# Patient Record
Sex: FEMALE | Race: WHITE | NOT HISPANIC OR LATINO | Employment: UNEMPLOYED | URBAN - NONMETROPOLITAN AREA
[De-identification: names, ages, dates, MRNs, and addresses within clinical notes are randomized per-mention and may not be internally consistent; named-entity substitution may affect disease eponyms.]

---
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
## 2023-01-12 ENCOUNTER — APPOINTMENT (OUTPATIENT)
Dept: ULTRASOUND IMAGING | Facility: HOSPITAL | Age: 18
End: 2023-01-12
Payer: COMMERCIAL

## 2023-01-12 ENCOUNTER — HOSPITAL ENCOUNTER (EMERGENCY)
Facility: HOSPITAL | Age: 18
Discharge: HOME | End: 2023-01-12
Attending: EMERGENCY MEDICINE | Admitting: EMERGENCY MEDICINE
Payer: COMMERCIAL

## 2023-01-12 VITALS
HEIGHT: 66 IN | OXYGEN SATURATION: 97 % | HEART RATE: 54 BPM | DIASTOLIC BLOOD PRESSURE: 73 MMHG | BODY MASS INDEX: 30.53 KG/M2 | TEMPERATURE: 97.7 F | SYSTOLIC BLOOD PRESSURE: 120 MMHG | RESPIRATION RATE: 18 BRPM | WEIGHT: 190 LBS

## 2023-01-12 DIAGNOSIS — N93.9 VAGINAL BLEEDING: Primary | ICD-10-CM

## 2023-01-12 LAB
ABO GROUP BLD: NORMAL
ALBUMIN SERPL-MCNC: 4.2 G/DL (ref 3.5–5.2)
ALBUMIN/GLOB SERPL: 1.5 G/DL
ALP SERPL-CCNC: 73 U/L (ref 39–117)
ALT SERPL W P-5'-P-CCNC: 7 U/L (ref 1–33)
ANION GAP SERPL CALCULATED.3IONS-SCNC: 8 MMOL/L (ref 5–15)
AST SERPL-CCNC: 10 U/L (ref 1–32)
BACTERIA UR QL AUTO: ABNORMAL /HPF
BASOPHILS # BLD AUTO: 0.1 10*3/MM3 (ref 0–0.2)
BASOPHILS NFR BLD AUTO: 1.4 % (ref 0–1.5)
BILIRUB SERPL-MCNC: 0.2 MG/DL (ref 0–1.2)
BILIRUB UR QL STRIP: NEGATIVE
BLD GP AB SCN SERPL QL: NEGATIVE
BUN SERPL-MCNC: 9 MG/DL (ref 6–20)
BUN/CREAT SERPL: 18.8 (ref 7–25)
CALCIUM SPEC-SCNC: 8.9 MG/DL (ref 8.6–10.5)
CHLORIDE SERPL-SCNC: 109 MMOL/L (ref 98–107)
CLARITY UR: ABNORMAL
CO2 SERPL-SCNC: 25 MMOL/L (ref 22–29)
COLOR UR: YELLOW
CREAT SERPL-MCNC: 0.48 MG/DL (ref 0.57–1)
DEPRECATED RDW RBC AUTO: 50.1 FL (ref 37–54)
EGFRCR SERPLBLD CKD-EPI 2021: 140.1 ML/MIN/1.73
EOSINOPHIL # BLD AUTO: 0.95 10*3/MM3 (ref 0–0.4)
EOSINOPHIL NFR BLD AUTO: 13.1 % (ref 0.3–6.2)
ERYTHROCYTE [DISTWIDTH] IN BLOOD BY AUTOMATED COUNT: 15.8 % (ref 12.3–15.4)
GLOBULIN UR ELPH-MCNC: 2.8 GM/DL
GLUCOSE SERPL-MCNC: 85 MG/DL (ref 65–99)
GLUCOSE UR STRIP-MCNC: NEGATIVE MG/DL
HCG INTACT+B SERPL-ACNC: <0.1 MIU/ML
HCT VFR BLD AUTO: 34.2 % (ref 34–46.6)
HGB BLD-MCNC: 10.4 G/DL (ref 12–15.9)
HGB UR QL STRIP.AUTO: ABNORMAL
HYALINE CASTS UR QL AUTO: ABNORMAL /LPF
IMM GRANULOCYTES # BLD AUTO: 0.01 10*3/MM3 (ref 0–0.05)
IMM GRANULOCYTES NFR BLD AUTO: 0.1 % (ref 0–0.5)
KETONES UR QL STRIP: NEGATIVE
LEUKOCYTE ESTERASE UR QL STRIP.AUTO: ABNORMAL
LYMPHOCYTES # BLD AUTO: 2.75 10*3/MM3 (ref 0.7–3.1)
LYMPHOCYTES NFR BLD AUTO: 37.8 % (ref 19.6–45.3)
MCH RBC QN AUTO: 26.8 PG (ref 26.6–33)
MCHC RBC AUTO-ENTMCNC: 30.4 G/DL (ref 31.5–35.7)
MCV RBC AUTO: 88.1 FL (ref 79–97)
MONOCYTES # BLD AUTO: 0.43 10*3/MM3 (ref 0.1–0.9)
MONOCYTES NFR BLD AUTO: 5.9 % (ref 5–12)
NEUTROPHILS NFR BLD AUTO: 3.03 10*3/MM3 (ref 1.7–7)
NEUTROPHILS NFR BLD AUTO: 41.7 % (ref 42.7–76)
NITRITE UR QL STRIP: NEGATIVE
NRBC BLD AUTO-RTO: 0 /100 WBC (ref 0–0.2)
NUMBER OF DOSES: NORMAL
PH UR STRIP.AUTO: 5.5 [PH] (ref 5–8)
PLATELET # BLD AUTO: 299 10*3/MM3 (ref 140–450)
PMV BLD AUTO: 11.4 FL (ref 6–12)
POTASSIUM SERPL-SCNC: 3.9 MMOL/L (ref 3.5–5.2)
PROT SERPL-MCNC: 7 G/DL (ref 6–8.5)
PROT UR QL STRIP: ABNORMAL
RBC # BLD AUTO: 3.88 10*6/MM3 (ref 3.77–5.28)
RBC # UR STRIP: ABNORMAL /HPF
REF LAB TEST METHOD: ABNORMAL
RH BLD: POSITIVE
SODIUM SERPL-SCNC: 142 MMOL/L (ref 136–145)
SP GR UR STRIP: 1.02 (ref 1–1.03)
SQUAMOUS #/AREA URNS HPF: ABNORMAL /HPF
UROBILINOGEN UR QL STRIP: ABNORMAL
WBC # UR STRIP: ABNORMAL /HPF
WBC NRBC COR # BLD: 7.27 10*3/MM3 (ref 3.4–10.8)

## 2023-01-12 PROCEDURE — 86901 BLOOD TYPING SEROLOGIC RH(D): CPT | Performed by: EMERGENCY MEDICINE

## 2023-01-12 PROCEDURE — 80053 COMPREHEN METABOLIC PANEL: CPT | Performed by: EMERGENCY MEDICINE

## 2023-01-12 PROCEDURE — 84702 CHORIONIC GONADOTROPIN TEST: CPT | Performed by: EMERGENCY MEDICINE

## 2023-01-12 PROCEDURE — 99284 EMERGENCY DEPT VISIT MOD MDM: CPT

## 2023-01-12 PROCEDURE — 76817 TRANSVAGINAL US OBSTETRIC: CPT

## 2023-01-12 PROCEDURE — 87086 URINE CULTURE/COLONY COUNT: CPT | Performed by: EMERGENCY MEDICINE

## 2023-01-12 PROCEDURE — 86850 RBC ANTIBODY SCREEN: CPT | Performed by: EMERGENCY MEDICINE

## 2023-01-12 PROCEDURE — 81001 URINALYSIS AUTO W/SCOPE: CPT | Performed by: EMERGENCY MEDICINE

## 2023-01-12 PROCEDURE — 85025 COMPLETE CBC W/AUTO DIFF WBC: CPT | Performed by: EMERGENCY MEDICINE

## 2023-01-12 PROCEDURE — 86900 BLOOD TYPING SEROLOGIC ABO: CPT | Performed by: EMERGENCY MEDICINE

## 2023-01-12 RX ORDER — SODIUM CHLORIDE 0.9 % (FLUSH) 0.9 %
10 SYRINGE (ML) INJECTION AS NEEDED
Status: DISCONTINUED | OUTPATIENT
Start: 2023-01-12 | End: 2023-01-12 | Stop reason: HOSPADM

## 2023-01-12 NOTE — ED PROVIDER NOTES
Subjective   History of Present Illness  Patient is a 17-year-old female with a history of multiple miscarriages who presents to the ER with vaginal bleeding.  Patient states her last menstrual period was December 4.  She states she had a positive urine pregnancy test 4 days ago.  She is a G6, P1.  Patient states this morning when she woke up she had some lower abdominal cramping and developed vaginal bleeding.  Patient states she is having heavy vaginal bleeding and is passing large clots.  She has seen no physician for this pregnancy.  She denies any fever, chest pain, shortness of air, nausea vomiting diarrhea, urinary changes, neurologic changes.        Review of Systems   Constitutional: Negative.    HENT: Negative.    Eyes: Negative.    Respiratory: Negative.    Cardiovascular: Negative.    Gastrointestinal: Positive for abdominal pain.   Endocrine: Negative.    Genitourinary: Positive for vaginal bleeding.   Musculoskeletal: Negative.    Skin: Negative.    Allergic/Immunologic: Negative.    Neurological: Negative.    Hematological: Negative.    Psychiatric/Behavioral: Negative.    All other systems reviewed and are negative.      Past Medical History:   Diagnosis Date   • Allergic    • Allergic rhinitis    • Asthma    • Attention deficit disorder    • Bipolar 1 disorder (HCC)    • Chlamydia    • Depression    • Fetal alcohol spectrum disorder    • Fetal alcohol syndrome    • Gestational diabetes    • Herpes    • Insomnia    • Nocturnal enuresis    • Seizure (HCC)        Allergies   Allergen Reactions   • Penicillins Anaphylaxis   • Shellfish-Derived Products Anaphylaxis   • Cow's Milk [Lac Bovis] Unknown (See Comments)     + on previous allergy testing.    • Peanut-Containing Drug Products Unknown (See Comments)     positive on past allergy testing (paper scanned)   • Shellfish-Derived Products Unknown (See Comments)     + on past allergy testing per Foster mother.    • Penicillins Unknown (See Comments)      Positive on allergy testing       Past Surgical History:   Procedure Laterality Date   •  SECTION N/A 7/3/2022    Procedure:  SECTION PRIMARY;  Surgeon: Jolene Bellamy DO;  Location: [facility] LABOR DELIVERY;  Service: Obstetrics;  Laterality: N/A;   •  SHUNT INSERTION     •  SHUNT REMOVAL         Family History   Adopted: Yes   Family history unknown: Yes       Social History     Socioeconomic History   • Marital status: Single   Tobacco Use   • Smoking status: Every Day     Packs/day: 0.25     Years: 5.00     Pack years: 1.25     Types: Cigarettes   • Smokeless tobacco: Never   Vaping Use   • Vaping Use: Never used   Substance and Sexual Activity   • Alcohol use: No   • Drug use: Yes     Frequency: 1.0 times per week     Types: Marijuana     Comment: not used in 3 weeks   • Sexual activity: Yes           Objective   Physical Exam  Vitals and nursing note reviewed.   Constitutional:       Appearance: She is well-developed.   HENT:      Head: Normocephalic and atraumatic.   Eyes:      Conjunctiva/sclera: Conjunctivae normal.      Pupils: Pupils are equal, round, and reactive to light.   Cardiovascular:      Rate and Rhythm: Normal rate and regular rhythm.      Heart sounds: Normal heart sounds.   Pulmonary:      Effort: Pulmonary effort is normal.      Breath sounds: Normal breath sounds.   Abdominal:      Palpations: Abdomen is soft.      Tenderness: There is no abdominal tenderness.   Genitourinary:     Vagina: Bleeding present. No tenderness.      Cervix: Cervical bleeding present.   Musculoskeletal:         General: No deformity. Normal range of motion.      Cervical back: Normal range of motion.   Skin:     General: Skin is warm.   Neurological:      Mental Status: She is alert and oriented to person, place, and time.   Psychiatric:         Behavior: Behavior normal.         Procedures           ED Course      Lab Results (last 24 hours)     Procedure Component Value Units Date/Time     CBC & Differential [[id number]]  (Abnormal) Collected: 01/12/23 1514    Specimen: Blood Updated: 01/12/23 1524    Narrative:      The following orders were created for panel order CBC & Differential.  Procedure                               Abnormality         Status                     ---------                               -----------         ------                     CBC Auto Differential[[id number]]        Abnormal            Final result                 Please view results for these tests on the individual orders.    Comprehensive Metabolic Panel [[id number]]  (Abnormal) Collected: 01/12/23 1514    Specimen: Blood Updated: 01/12/23 1548     Glucose 85 mg/dL      BUN 9 mg/dL      Creatinine 0.48 mg/dL      Sodium 142 mmol/L      Potassium 3.9 mmol/L      Chloride 109 mmol/L      CO2 25.0 mmol/L      Calcium 8.9 mg/dL      Total Protein 7.0 g/dL      Albumin 4.2 g/dL      ALT (SGPT) 7 U/L      AST (SGOT) 10 U/L      Alkaline Phosphatase 73 U/L      Total Bilirubin 0.2 mg/dL      Globulin 2.8 gm/dL      A/G Ratio 1.5 g/dL      BUN/Creatinine Ratio 18.8     Anion Gap 8.0 mmol/L      eGFR 140.1 mL/min/1.73      Comment: National Kidney Foundation and American Society of Nephrology (ASN) Task Force recommended calculation based on the Chronic Kidney Disease Epidemiology Collaboration (CKD-EPI) equation refit without adjustment for race.       Narrative:      GFR Normal >60  Chronic Kidney Disease <60  Kidney Failure <15      hCG, Quantitative, Pregnancy [[id number]] Collected: 01/12/23 1514    Specimen: Blood Updated: 01/12/23 1554     HCG Quantitative <0.10 mIU/mL     Narrative:      HCG Ranges by Gestational Age    Females - non-pregnant premenopausal   </= 1mIU/mL HCG  Females - postmenopausal               </= 7mIU/mL HCG    3 Weeks         5.8 -    71.2 mIU/mL  4 Weeks         9.5 -     750 mIU/mL  5 Weeks         217 -   7,138 mIU/mL  6 Weeks         158 -  31,795 mIU/mL  7 Weeks       3,697 - 163,563 mIU/mL  8  Weeks      32,065 - 149,571 mIU/mL  9 Weeks      63,803 - 151,410 mIU/mL  10 Weeks     46,509 - 186,977 mIU/mL  12 Weeks     27,832 - 210,612 mIU/mL  14 Weeks     13,950 -  62,530 mIU/mL  15 Weeks     12,039 -  70,971 mIU/mL  16 Weeks      9,040 -  56,451 mIU/mL  17 Weeks      8,175 -  55,868 mIU/mL  18 Weeks      8,099 -  58,176 mIU/mL  Results may be falsely decreased if patient taking Biotin.      CBC Auto Differential [[id number]]  (Abnormal) Collected: 01/12/23 1514    Specimen: Blood Updated: 01/12/23 1524     WBC 7.27 10*3/mm3      RBC 3.88 10*6/mm3      Hemoglobin 10.4 g/dL      Hematocrit 34.2 %      MCV 88.1 fL      MCH 26.8 pg      MCHC 30.4 g/dL      RDW 15.8 %      RDW-SD 50.1 fl      MPV 11.4 fL      Platelets 299 10*3/mm3      Neutrophil % 41.7 %      Lymphocyte % 37.8 %      Monocyte % 5.9 %      Eosinophil % 13.1 %      Basophil % 1.4 %      Immature Grans % 0.1 %      Neutrophils, Absolute 3.03 10*3/mm3      Lymphocytes, Absolute 2.75 10*3/mm3      Monocytes, Absolute 0.43 10*3/mm3      Eosinophils, Absolute 0.95 10*3/mm3      Basophils, Absolute 0.10 10*3/mm3      Immature Grans, Absolute 0.01 10*3/mm3      nRBC 0.0 /100 WBC     Urinalysis With Culture If Indicated - Urine, Clean Catch [[id number]]  (Abnormal) Collected: 01/12/23 1525    Specimen: Urine, Clean Catch Updated: 01/12/23 1606     Color, UA Yellow     Appearance, UA Cloudy     pH, UA 5.5     Specific Gravity, UA 1.022     Glucose, UA Negative     Ketones, UA Negative     Bilirubin, UA Negative     Blood, UA Large (3+)     Protein, UA 30 mg/dL (1+)     Leuk Esterase, UA Small (1+)     Nitrite, UA Negative     Urobilinogen, UA 0.2 E.U./dL    Narrative:      In absence of clinical symptoms, the presence of pyuria, bacteria, and/or nitrites on the urinalysis result does not correlate with infection.    Urinalysis, Microscopic Only - Urine, Clean Catch [[id number]]  (Abnormal) Collected: 01/12/23 1525    Specimen: Urine, Clean Catch Updated:  "23 1606     RBC, UA Too Numerous to Count /HPF      WBC, UA 6-12 /HPF      Bacteria, UA Trace /HPF      Squamous Epithelial Cells, UA 7-12 /HPF      Hyaline Casts, UA 0-2 /LPF      Methodology Manual Light Microscopy    Urine Culture - Urine, Urine, Clean Catch [[id number]] Collected: 23 1525    Specimen: Urine, Clean Catch Updated: 23 1606        US Ob Transvaginal   Final Result   1. Normal appearance of uterus, no endometrial sac or fetal pole is   visualized. Correlation with beta hCG values is recommended and repeat   ultrasound as clinically indicated.   2. No adnexal mass, this exam does not exclude or \"rule out\" ectopic   pregnancy.   3. Normal appearance of both ovaries.   4. Small amount of free fluid in the posterior cul-de-sac.   This report was finalized on 2023 15:06 by Dr. Adelso Dodd MD.                                           Medical Decision Making  Patient is a 17-year-old female with a history of multiple miscarriages who presents to the ER with vaginal bleeding.  Patient states her last menstrual period was .  She states she had a positive urine pregnancy test 4 days ago.  She is a G6, P1.  Patient states this morning when she woke up she had some lower abdominal cramping and developed vaginal bleeding.  Patient states she is having heavy vaginal bleeding and is passing large clots.  She has seen no physician for this pregnancy.  She denies any fever, chest pain, shortness of air, nausea vomiting diarrhea, urinary changes, neurologic changes.      Differential diagnosis: Threatened , completed , menstrual cycle, dysfunctional uterine bleeding    Labs are unremarkable.  Beta hCG was 0.  Urine was contaminated.  Pelvic ultrasound was ordered and showed a normal appearance of the uterus.  There is no endometrial sac or fetal pole.  Patient did have a small amount of free fluid in the posterior cul-de-sac.  I suspect the patient is not pregnant. "  She most likely had a false positive UPT at home.  There is a small likelihood of a complete  but I think the patient was never pregnant.  Patient will be discharged home to follow-up with gynecology.  She is to return for any worsening or new bleeding, pain or other concerns.  Patient was Rh+.    Vaginal bleeding: acute illness or injury  Amount and/or Complexity of Data Reviewed  Labs: ordered. Decision-making details documented in ED Course.  Radiology: ordered. Decision-making details documented in ED Course.      Risk  Prescription drug management.          Final diagnoses:   Vaginal bleeding       ED Disposition  ED Disposition     ED Disposition   Discharge    Condition   Stable    Comment   --             Matilde Fletcher MD  [address] [address]  [phone]    Schedule an appointment as soon as possible for a visit            Medication List      No changes were made to your prescriptions during this visit.         Jocelyn Wallis MD  [phone]

## 2023-01-13 LAB — BACTERIA SPEC AEROBE CULT: NORMAL

## 2023-02-25 ENCOUNTER — HOSPITAL ENCOUNTER (EMERGENCY)
Facility: HOSPITAL | Age: 18
Discharge: HOME | End: 2023-02-25
Admitting: INTERNAL MEDICINE
Payer: COMMERCIAL

## 2023-02-25 VITALS
SYSTOLIC BLOOD PRESSURE: 111 MMHG | DIASTOLIC BLOOD PRESSURE: 78 MMHG | WEIGHT: 140 LBS | OXYGEN SATURATION: 99 % | TEMPERATURE: 98.3 F | RESPIRATION RATE: 16 BRPM | HEART RATE: 99 BPM | BODY MASS INDEX: 22.5 KG/M2 | HEIGHT: 66 IN

## 2023-02-25 DIAGNOSIS — H60.503 ACUTE OTITIS EXTERNA OF BOTH EARS, UNSPECIFIED TYPE: Primary | ICD-10-CM

## 2023-02-25 DIAGNOSIS — H60.02 ABSCESS OF LEFT EARLOBE: ICD-10-CM

## 2023-02-25 PROCEDURE — 99282 EMERGENCY DEPT VISIT SF MDM: CPT

## 2023-02-25 RX ORDER — DOXYCYCLINE 100 MG/1
100 CAPSULE ORAL 2 TIMES DAILY
Qty: 20 CAPSULE | Refills: 0 | Status: SHIPPED | OUTPATIENT
Start: 2023-02-25 | End: 2023-03-07

## 2023-03-31 ENCOUNTER — APPOINTMENT (OUTPATIENT)
Dept: ULTRASOUND IMAGING | Facility: HOSPITAL | Age: 18
End: 2023-03-31
Payer: COMMERCIAL

## 2023-03-31 ENCOUNTER — HOSPITAL ENCOUNTER (EMERGENCY)
Facility: HOSPITAL | Age: 18
Discharge: HOME | End: 2023-03-31
Admitting: EMERGENCY MEDICINE
Payer: COMMERCIAL

## 2023-03-31 ENCOUNTER — APPOINTMENT (OUTPATIENT)
Dept: CT IMAGING | Facility: HOSPITAL | Age: 18
End: 2023-03-31
Payer: COMMERCIAL

## 2023-03-31 VITALS
HEART RATE: 74 BPM | RESPIRATION RATE: 18 BRPM | HEIGHT: 66 IN | TEMPERATURE: 98.4 F | SYSTOLIC BLOOD PRESSURE: 107 MMHG | BODY MASS INDEX: 22.5 KG/M2 | OXYGEN SATURATION: 98 % | DIASTOLIC BLOOD PRESSURE: 66 MMHG | WEIGHT: 140 LBS

## 2023-03-31 DIAGNOSIS — N93.9 VAGINAL BLEEDING: Primary | ICD-10-CM

## 2023-03-31 LAB
ALBUMIN SERPL-MCNC: 4.4 G/DL (ref 3.5–5.2)
ALBUMIN/GLOB SERPL: 1.8 G/DL
ALP SERPL-CCNC: 64 U/L (ref 39–117)
ALT SERPL W P-5'-P-CCNC: 9 U/L (ref 1–33)
ANION GAP SERPL CALCULATED.3IONS-SCNC: 11 MMOL/L (ref 5–15)
AST SERPL-CCNC: 14 U/L (ref 1–32)
B-HCG UR QL: NEGATIVE
BACTERIA UR QL AUTO: ABNORMAL /HPF
BASOPHILS # BLD AUTO: 0.08 10*3/MM3 (ref 0–0.2)
BASOPHILS NFR BLD AUTO: 1.1 % (ref 0–1.5)
BILIRUB SERPL-MCNC: 0.3 MG/DL (ref 0–1.2)
BILIRUB UR QL STRIP: NEGATIVE
BUN SERPL-MCNC: 10 MG/DL (ref 6–20)
BUN/CREAT SERPL: 26.3 (ref 7–25)
CALCIUM SPEC-SCNC: 9.1 MG/DL (ref 8.6–10.5)
CHLORIDE SERPL-SCNC: 105 MMOL/L (ref 98–107)
CLARITY UR: CLEAR
CO2 SERPL-SCNC: 22 MMOL/L (ref 22–29)
COLOR UR: YELLOW
CREAT SERPL-MCNC: 0.38 MG/DL (ref 0.57–1)
CRP SERPL-MCNC: <0.3 MG/DL (ref 0–0.5)
D-LACTATE SERPL-SCNC: 1.1 MMOL/L (ref 0.5–2)
DEPRECATED RDW RBC AUTO: 48.7 FL (ref 37–54)
EGFRCR SERPLBLD CKD-EPI 2021: 148.2 ML/MIN/1.73
EOSINOPHIL # BLD AUTO: 0.65 10*3/MM3 (ref 0–0.4)
EOSINOPHIL NFR BLD AUTO: 8.9 % (ref 0.3–6.2)
ERYTHROCYTE [DISTWIDTH] IN BLOOD BY AUTOMATED COUNT: 14.9 % (ref 12.3–15.4)
EXPIRATION DATE: NORMAL
GLOBULIN UR ELPH-MCNC: 2.5 GM/DL
GLUCOSE SERPL-MCNC: 93 MG/DL (ref 65–99)
GLUCOSE UR STRIP-MCNC: NEGATIVE MG/DL
HCG INTACT+B SERPL-ACNC: <0.1 MIU/ML
HCT VFR BLD AUTO: 34.9 % (ref 34–46.6)
HGB BLD-MCNC: 10.6 G/DL (ref 12–15.9)
HGB UR QL STRIP.AUTO: ABNORMAL
HYALINE CASTS UR QL AUTO: ABNORMAL /LPF
IMM GRANULOCYTES # BLD AUTO: 0.01 10*3/MM3 (ref 0–0.05)
IMM GRANULOCYTES NFR BLD AUTO: 0.1 % (ref 0–0.5)
INR PPP: 1.03 (ref 0.91–1.09)
INTERNAL NEGATIVE CONTROL: NORMAL
INTERNAL POSITIVE CONTROL: NORMAL
KETONES UR QL STRIP: NEGATIVE
LEUKOCYTE ESTERASE UR QL STRIP.AUTO: NEGATIVE
LIPASE SERPL-CCNC: 16 U/L (ref 13–60)
LYMPHOCYTES # BLD AUTO: 2.22 10*3/MM3 (ref 0.7–3.1)
LYMPHOCYTES NFR BLD AUTO: 30.4 % (ref 19.6–45.3)
Lab: NORMAL
MCH RBC QN AUTO: 26.9 PG (ref 26.6–33)
MCHC RBC AUTO-ENTMCNC: 30.4 G/DL (ref 31.5–35.7)
MCV RBC AUTO: 88.6 FL (ref 79–97)
MONOCYTES # BLD AUTO: 0.47 10*3/MM3 (ref 0.1–0.9)
MONOCYTES NFR BLD AUTO: 6.4 % (ref 5–12)
NEUTROPHILS NFR BLD AUTO: 3.88 10*3/MM3 (ref 1.7–7)
NEUTROPHILS NFR BLD AUTO: 53.1 % (ref 42.7–76)
NITRITE UR QL STRIP: NEGATIVE
NRBC BLD AUTO-RTO: 0 /100 WBC (ref 0–0.2)
PH UR STRIP.AUTO: 8 [PH] (ref 5–8)
PLATELET # BLD AUTO: 329 10*3/MM3 (ref 140–450)
PMV BLD AUTO: 11.8 FL (ref 6–12)
POTASSIUM SERPL-SCNC: 4.4 MMOL/L (ref 3.5–5.2)
PROT SERPL-MCNC: 6.9 G/DL (ref 6–8.5)
PROT UR QL STRIP: NEGATIVE
PROTHROMBIN TIME: 13.6 SECONDS (ref 11.8–14.8)
RBC # BLD AUTO: 3.94 10*6/MM3 (ref 3.77–5.28)
RBC # UR STRIP: ABNORMAL /HPF
REF LAB TEST METHOD: ABNORMAL
SODIUM SERPL-SCNC: 138 MMOL/L (ref 136–145)
SP GR UR STRIP: 1.02 (ref 1–1.03)
SQUAMOUS #/AREA URNS HPF: ABNORMAL /HPF
UROBILINOGEN UR QL STRIP: ABNORMAL
WBC # UR STRIP: ABNORMAL /HPF
WBC NRBC COR # BLD: 7.31 10*3/MM3 (ref 3.4–10.8)

## 2023-03-31 PROCEDURE — 99284 EMERGENCY DEPT VISIT MOD MDM: CPT

## 2023-03-31 PROCEDURE — 25510000001 IOPAMIDOL 61 % SOLUTION: Performed by: NURSE PRACTITIONER

## 2023-03-31 PROCEDURE — 85025 COMPLETE CBC W/AUTO DIFF WBC: CPT | Performed by: NURSE PRACTITIONER

## 2023-03-31 PROCEDURE — 81025 URINE PREGNANCY TEST: CPT | Performed by: NURSE PRACTITIONER

## 2023-03-31 PROCEDURE — 80053 COMPREHEN METABOLIC PANEL: CPT | Performed by: NURSE PRACTITIONER

## 2023-03-31 PROCEDURE — 83605 ASSAY OF LACTIC ACID: CPT | Performed by: NURSE PRACTITIONER

## 2023-03-31 PROCEDURE — 83690 ASSAY OF LIPASE: CPT | Performed by: NURSE PRACTITIONER

## 2023-03-31 PROCEDURE — 84702 CHORIONIC GONADOTROPIN TEST: CPT | Performed by: NURSE PRACTITIONER

## 2023-03-31 PROCEDURE — 76856 US EXAM PELVIC COMPLETE: CPT

## 2023-03-31 PROCEDURE — 86140 C-REACTIVE PROTEIN: CPT | Performed by: NURSE PRACTITIONER

## 2023-03-31 PROCEDURE — 85610 PROTHROMBIN TIME: CPT | Performed by: NURSE PRACTITIONER

## 2023-03-31 PROCEDURE — 74177 CT ABD & PELVIS W/CONTRAST: CPT

## 2023-03-31 PROCEDURE — 81001 URINALYSIS AUTO W/SCOPE: CPT | Performed by: NURSE PRACTITIONER

## 2023-03-31 RX ORDER — SODIUM CHLORIDE 0.9 % (FLUSH) 0.9 %
10 SYRINGE (ML) INJECTION AS NEEDED
Status: DISCONTINUED | OUTPATIENT
Start: 2023-03-31 | End: 2023-03-31 | Stop reason: HOSPADM

## 2023-03-31 RX ADMIN — IOPAMIDOL 100 ML: 612 INJECTION, SOLUTION INTRAVENOUS at 12:37

## 2023-03-31 RX ADMIN — SODIUM CHLORIDE, POTASSIUM CHLORIDE, SODIUM LACTATE AND CALCIUM CHLORIDE 1000 ML: 600; 310; 30; 20 INJECTION, SOLUTION INTRAVENOUS at 12:18

## 2023-03-31 NOTE — ED PROVIDER NOTES
Subjective   History of Present Illness  17 yof with PMH of ADHD, fetal alcohol syndrome, asthma, seizures and bipolar disorder presents today with abdomen pain and vaginal bleeding.  She states she had a miscarriage several weeks ago.  She did not require a D & C.  She states she has not had a menstrual period since that time.  She started having what she calls a large amount of vaginal bleeding as well as lower abdomen cramps this morning.  She has had unprotected sexual intercourse since her miscarriage so she is concerned she is pregnant again.  She denies n/v/d.  She denies vaginal discharge.  She denies dysuria. She denies weakness or dizziness.  She denies CP or SOB.  She states she also has a history of anemia and takes iron.        Review of Systems   Constitutional: Negative for activity change, appetite change, fatigue and fever.   HENT: Negative for congestion, ear pain, facial swelling and sore throat.    Eyes: Negative for discharge and visual disturbance.   Respiratory: Negative for apnea, chest tightness, shortness of breath, wheezing and stridor.    Cardiovascular: Negative for chest pain and palpitations.   Gastrointestinal: Positive for abdominal pain. Negative for abdominal distention, diarrhea, nausea and vomiting.   Genitourinary: Positive for vaginal bleeding. Negative for difficulty urinating and dysuria.   Musculoskeletal: Negative for arthralgias and myalgias.   Skin: Negative for rash and wound.   Neurological: Negative for dizziness and seizures.   Psychiatric/Behavioral: Negative for agitation and confusion.       Past Medical History:   Diagnosis Date   • Allergic    • Allergic rhinitis    • Asthma    • Attention deficit disorder    • Bipolar 1 disorder (HCC)    • Chlamydia    • Depression    • Fetal alcohol spectrum disorder    • Fetal alcohol syndrome    • Gestational diabetes    • Herpes    • Insomnia    • Nocturnal enuresis    • Seizure (HCC)        Allergies   Allergen Reactions    • Penicillins Anaphylaxis   • Shellfish-Derived Products Anaphylaxis   • Cow's Milk [Lac Bovis] Unknown (See Comments)     + on previous allergy testing.    • Peanut-Containing Drug Products Unknown (See Comments)     positive on past allergy testing (paper scanned)   • Shellfish-Derived Products Unknown (See Comments)     + on past allergy testing per Foster mother.    • Penicillins Unknown (See Comments)     Positive on allergy testing       Past Surgical History:   Procedure Laterality Date   •  SECTION N/A 7/3/2022    Procedure:  SECTION PRIMARY;  Surgeon: Jolene Bellamy DO;  [facility]: [facility] LABOR DELIVERY;  Service: Obstetrics;  Laterality: N/A;   •  SHUNT INSERTION     •  SHUNT REMOVAL         Family History   Adopted: Yes   Family history unknown: Yes       Social History     Socioeconomic History   • Marital status: Single   Tobacco Use   • Smoking status: Every Day     Packs/day: 0.25     Years: 5.00     Pack years: 1.25     Types: Cigarettes   • Smokeless tobacco: Never   Vaping Use   • Vaping Use: Never used   Substance and Sexual Activity   • Alcohol use: No   • Drug use: Yes     Frequency: 1.0 times per week     Types: Marijuana   • Sexual activity: Yes           Objective   Physical Exam  Vitals and nursing note reviewed.   Constitutional:       Appearance: She is well-developed.   HENT:      Head: Normocephalic.   Eyes:      Pupils: Pupils are equal, round, and reactive to light.   Cardiovascular:      Rate and Rhythm: Normal rate and regular rhythm.      Heart sounds: No murmur heard.  Pulmonary:      Effort: Pulmonary effort is normal.      Breath sounds: Normal breath sounds.   Abdominal:      General: Bowel sounds are normal.      Palpations: Abdomen is soft.      Tenderness: There is no abdominal tenderness. There is no right CVA tenderness or left CVA tenderness.   Musculoskeletal:         General: Normal range of motion.      Cervical back: Normal range of motion  and neck supple.   Skin:     General: Skin is warm and dry.   Neurological:      Mental Status: She is alert and oriented to person, place, and time.         Procedures           ED Course  ED Course as of 04/05/23 1641   Fri Mar 31, 2023   1352 Pelvic US - IMPRESSION:  1. No abnormal thickening of the endometrium. No endometrial fluid or retained products of conception identified. 2. Small volume free fluid of the cul-de-sac may be physiologic. 3. Normal sonographic appearance to the ovaries.  Ct of the abdomen/pelvis - IMPRESSION:1. Moderate fullness of the uterus, bilateral ovarian follicles and a small free fluid in the pelvis may represent physiological/cyclical changes. 2. No finding to suggest appendicitis. No gallstones.  3. No renal calculi or finding to suggest obstructive uropathy. Urinary bladder is poorly distended and moderate thickening of the walls.  Lab work reviewed.  Her hgb is 10.6.  She states she has a history of anemia and takes iron.  Otherwise, lab work is unremarkable.  I reviewed her testing results with her.  She voiced understanding of both results and instructions. [KS]      ED Course User Index  [KS] Juan Jonas APRN                                           Medical Decision Making  17 yof with PMH of ADHD, fetal alcohol syndrome, asthma, seizures and bipolar disorder presents today with abdomen pain and vaginal bleeding.  She states she had a miscarriage several weeks ago.  She did not require a D & C.  She states she has not had a menstrual period since that time.  She started having what she calls a large amount of vaginal bleeding as well as lower abdomen cramps this morning.  She has had unprotected sexual intercourse since her miscarriage so she is concerned she is pregnant again.  She denies n/v/d.  She denies vaginal discharge.  She denies dysuria. She denies weakness or dizziness.  She denies CP or SOB.  She states she also has a history of anemia and takes  iron.    Pelvic US - IMPRESSION:  1. No abnormal thickening of the endometrium. No endometrial fluid or retained products of conception identified. 2. Small volume free fluid of the cul-de-sac may be physiologic. 3. Normal sonographic appearance to the ovaries.  Ct of the abdomen/pelvis - IMPRESSION:1. Moderate fullness of the uterus, bilateral ovarian follicles and a small free fluid in the pelvis may represent physiological/cyclical changes. 2. No finding to suggest appendicitis. No gallstones.  3. No renal calculi or finding to suggest obstructive uropathy. Urinary bladder is poorly distended and moderate thickening of the walls.  Lab work reviewed.  Her hgb is 10.6.  She states she has a history of anemia and takes iron.  Otherwise, lab work is unremarkable.  I reviewed her testing results with her.  She voiced understanding of both results and instructions.      Vaginal bleeding: acute illness or injury  Amount and/or Complexity of Data Reviewed  Labs: ordered.  Radiology: ordered.      Risk  Prescription drug management.          Final diagnoses:   None       ED Disposition  ED Disposition     None          No follow-up provider specified.       Medication List      No changes were made to your prescriptions during this visit.          Juan Jonas, APRN  04/05/23 3565

## 2023-03-31 NOTE — Clinical Note
[facility] EMERGENCY DEPARTMENT  [facility] [address]  Phone: [phone]    Dani Trinidad was seen and treated in our emergency department on 3/31/2023.  She may return to work on 04/02/2023.         Thank you for choosing [facility].    Juan Jonas, APRN

## 2023-03-31 NOTE — DISCHARGE INSTRUCTIONS
Follow up with one of the [facility] physician groups below to setup primary care. If you have trouble making an appointment, please call the [facility] Nurse Line at [phone]    [facility] Primary [facility] - [place]  [address] [place] [address]  [id number] [phone]    [facility] Internal Medicine - [address], [address], [place] [address] [phone]    [facility] Family & Internal Medicine - [address] [place] [address], [address], [place] [address] [phone]     [facility] Primary Care ([facility]) - [address], [address], [place] [address] [phone]    [facility] Primary Care - [address], [address] [phone]    [facility] Family Medicine - [address], [place] [address] [phone]    [facility] - [place]  [address] [place], [place], [address] [phone]    [facility] - [place]  [address], [address] [phone]    [facility] Primary Care - [place]  [address] [address] [phone]    [facility] - [place]  [address] [place] [address], [place], [address] [phone]        PEDIATRIC:    [facility] Pediatrics - [address] [address], [address], [place] [address] [phone]    Drink plenty of fluid.  Tylenol or motrin as needed for pain/fever.  Monitor bleeding.  Follow up with Pcp - call Monday for appointment. Return to ED if condition does not improve or worsens

## 2023-06-08 ENCOUNTER — APPOINTMENT (OUTPATIENT)
Dept: CT IMAGING | Facility: HOSPITAL | Age: 18
End: 2023-06-08

## 2023-06-08 ENCOUNTER — HOSPITAL ENCOUNTER (OUTPATIENT)
Facility: HOSPITAL | Age: 18
Setting detail: OBSERVATION
LOS: 1 days | Discharge: HOME | End: 2023-06-09
Attending: STUDENT IN AN ORGANIZED HEALTH CARE EDUCATION/TRAINING PROGRAM | Admitting: FAMILY MEDICINE

## 2023-06-08 DIAGNOSIS — D72.829 LEUKOCYTOSIS, UNSPECIFIED TYPE: ICD-10-CM

## 2023-06-08 DIAGNOSIS — N12 PYELONEPHRITIS: Primary | ICD-10-CM

## 2023-06-08 PROBLEM — F31.9 BIPOLAR 1 DISORDER: Status: ACTIVE | Noted: 2023-06-08

## 2023-06-08 LAB
AMPHET+METHAMPHET UR QL: NEGATIVE
AMPHETAMINES UR QL: NEGATIVE
ANION GAP SERPL CALCULATED.3IONS-SCNC: 14 MMOL/L (ref 5–15)
B-HCG UR QL: NEGATIVE
BACTERIA UR QL AUTO: ABNORMAL /HPF
BARBITURATES UR QL SCN: NEGATIVE
BASOPHILS # BLD AUTO: 0.05 10*3/MM3 (ref 0–0.2)
BASOPHILS NFR BLD AUTO: 0.4 % (ref 0–1.5)
BENZODIAZ UR QL SCN: NEGATIVE
BILIRUB UR QL STRIP: ABNORMAL
BUN SERPL-MCNC: 15 MG/DL (ref 6–20)
BUN/CREAT SERPL: 25.4 (ref 7–25)
BUPRENORPHINE SERPL-MCNC: NEGATIVE NG/ML
CALCIUM SPEC-SCNC: 8.8 MG/DL (ref 8.6–10.5)
CANNABINOIDS SERPL QL: POSITIVE
CHLORIDE SERPL-SCNC: 96 MMOL/L (ref 98–107)
CLARITY UR: ABNORMAL
CO2 SERPL-SCNC: 25 MMOL/L (ref 22–29)
COCAINE UR QL: NEGATIVE
COLOR UR: ABNORMAL
CREAT SERPL-MCNC: 0.59 MG/DL (ref 0.57–1)
D-LACTATE SERPL-SCNC: 0.9 MMOL/L (ref 0.5–2)
DEPRECATED RDW RBC AUTO: 44.9 FL (ref 37–54)
EGFRCR SERPLBLD CKD-EPI 2021: 132.5 ML/MIN/1.73
EOSINOPHIL # BLD AUTO: 0.01 10*3/MM3 (ref 0–0.4)
EOSINOPHIL NFR BLD AUTO: 0.1 % (ref 0.3–6.2)
ERYTHROCYTE [DISTWIDTH] IN BLOOD BY AUTOMATED COUNT: 14.5 % (ref 12.3–15.4)
FENTANYL UR-MCNC: NEGATIVE NG/ML
GLUCOSE SERPL-MCNC: 129 MG/DL (ref 65–99)
GLUCOSE UR STRIP-MCNC: NEGATIVE MG/DL
HCT VFR BLD AUTO: 33.4 % (ref 34–46.6)
HGB BLD-MCNC: 10.8 G/DL (ref 12–15.9)
HGB UR QL STRIP.AUTO: ABNORMAL
HYALINE CASTS UR QL AUTO: ABNORMAL /LPF
IMM GRANULOCYTES # BLD AUTO: 0.04 10*3/MM3 (ref 0–0.05)
IMM GRANULOCYTES NFR BLD AUTO: 0.3 % (ref 0–0.5)
KETONES UR QL STRIP: ABNORMAL
LEUKOCYTE ESTERASE UR QL STRIP.AUTO: ABNORMAL
LIPASE SERPL-CCNC: 13 U/L (ref 13–60)
LYMPHOCYTES # BLD AUTO: 1.23 10*3/MM3 (ref 0.7–3.1)
LYMPHOCYTES NFR BLD AUTO: 9.8 % (ref 19.6–45.3)
MAGNESIUM SERPL-MCNC: 2.1 MG/DL (ref 1.7–2.2)
MCH RBC QN AUTO: 27.6 PG (ref 26.6–33)
MCHC RBC AUTO-ENTMCNC: 32.3 G/DL (ref 31.5–35.7)
MCV RBC AUTO: 85.2 FL (ref 79–97)
METHADONE UR QL SCN: NEGATIVE
MONOCYTES # BLD AUTO: 1.96 10*3/MM3 (ref 0.1–0.9)
MONOCYTES NFR BLD AUTO: 15.6 % (ref 5–12)
NEUTROPHILS NFR BLD AUTO: 73.8 % (ref 42.7–76)
NEUTROPHILS NFR BLD AUTO: 9.3 10*3/MM3 (ref 1.7–7)
NITRITE UR QL STRIP: POSITIVE
NRBC BLD AUTO-RTO: 0 /100 WBC (ref 0–0.2)
OPIATES UR QL: NEGATIVE
OXYCODONE UR QL SCN: NEGATIVE
PCP UR QL SCN: NEGATIVE
PH UR STRIP.AUTO: 5.5 [PH] (ref 5–8)
PLATELET # BLD AUTO: 240 10*3/MM3 (ref 140–450)
PMV BLD AUTO: 11.4 FL (ref 6–12)
POTASSIUM SERPL-SCNC: 3.2 MMOL/L (ref 3.5–5.2)
PROPOXYPH UR QL: NEGATIVE
PROT UR QL STRIP: ABNORMAL
RBC # BLD AUTO: 3.92 10*6/MM3 (ref 3.77–5.28)
RBC # UR STRIP: ABNORMAL /HPF
REF LAB TEST METHOD: ABNORMAL
SODIUM SERPL-SCNC: 135 MMOL/L (ref 136–145)
SP GR UR STRIP: 1.02 (ref 1–1.03)
SQUAMOUS #/AREA URNS HPF: ABNORMAL /HPF
TRICYCLICS UR QL SCN: POSITIVE
UROBILINOGEN UR QL STRIP: ABNORMAL
WBC # UR STRIP: ABNORMAL /HPF
WBC NRBC COR # BLD: 12.59 10*3/MM3 (ref 3.4–10.8)

## 2023-06-08 PROCEDURE — G0378 HOSPITAL OBSERVATION PER HR: HCPCS

## 2023-06-08 PROCEDURE — 83690 ASSAY OF LIPASE: CPT | Performed by: STUDENT IN AN ORGANIZED HEALTH CARE EDUCATION/TRAINING PROGRAM

## 2023-06-08 PROCEDURE — 87186 SC STD MICRODIL/AGAR DIL: CPT | Performed by: FAMILY MEDICINE

## 2023-06-08 PROCEDURE — 87147 CULTURE TYPE IMMUNOLOGIC: CPT | Performed by: STUDENT IN AN ORGANIZED HEALTH CARE EDUCATION/TRAINING PROGRAM

## 2023-06-08 PROCEDURE — 74177 CT ABD & PELVIS W/CONTRAST: CPT

## 2023-06-08 PROCEDURE — 96376 TX/PRO/DX INJ SAME DRUG ADON: CPT

## 2023-06-08 PROCEDURE — 81025 URINE PREGNANCY TEST: CPT | Performed by: STUDENT IN AN ORGANIZED HEALTH CARE EDUCATION/TRAINING PROGRAM

## 2023-06-08 PROCEDURE — 83605 ASSAY OF LACTIC ACID: CPT | Performed by: STUDENT IN AN ORGANIZED HEALTH CARE EDUCATION/TRAINING PROGRAM

## 2023-06-08 PROCEDURE — 85025 COMPLETE CBC W/AUTO DIFF WBC: CPT | Performed by: STUDENT IN AN ORGANIZED HEALTH CARE EDUCATION/TRAINING PROGRAM

## 2023-06-08 PROCEDURE — 81001 URINALYSIS AUTO W/SCOPE: CPT | Performed by: STUDENT IN AN ORGANIZED HEALTH CARE EDUCATION/TRAINING PROGRAM

## 2023-06-08 PROCEDURE — 96374 THER/PROPH/DIAG INJ IV PUSH: CPT

## 2023-06-08 PROCEDURE — 36415 COLL VENOUS BLD VENIPUNCTURE: CPT

## 2023-06-08 PROCEDURE — 0 CEFEPIME PER 500 MG: Performed by: STUDENT IN AN ORGANIZED HEALTH CARE EDUCATION/TRAINING PROGRAM

## 2023-06-08 PROCEDURE — 83735 ASSAY OF MAGNESIUM: CPT | Performed by: FAMILY MEDICINE

## 2023-06-08 PROCEDURE — 87086 URINE CULTURE/COLONY COUNT: CPT | Performed by: FAMILY MEDICINE

## 2023-06-08 PROCEDURE — 87088 URINE BACTERIA CULTURE: CPT | Performed by: FAMILY MEDICINE

## 2023-06-08 PROCEDURE — 87150 DNA/RNA AMPLIFIED PROBE: CPT | Performed by: STUDENT IN AN ORGANIZED HEALTH CARE EDUCATION/TRAINING PROGRAM

## 2023-06-08 PROCEDURE — 96375 TX/PRO/DX INJ NEW DRUG ADDON: CPT

## 2023-06-08 PROCEDURE — 80307 DRUG TEST PRSMV CHEM ANLYZR: CPT | Performed by: FAMILY MEDICINE

## 2023-06-08 PROCEDURE — 87040 BLOOD CULTURE FOR BACTERIA: CPT | Performed by: STUDENT IN AN ORGANIZED HEALTH CARE EDUCATION/TRAINING PROGRAM

## 2023-06-08 PROCEDURE — 80048 BASIC METABOLIC PNL TOTAL CA: CPT | Performed by: STUDENT IN AN ORGANIZED HEALTH CARE EDUCATION/TRAINING PROGRAM

## 2023-06-08 PROCEDURE — 96361 HYDRATE IV INFUSION ADD-ON: CPT

## 2023-06-08 PROCEDURE — 25010000002 ONDANSETRON PER 1 MG: Performed by: STUDENT IN AN ORGANIZED HEALTH CARE EDUCATION/TRAINING PROGRAM

## 2023-06-08 PROCEDURE — 99284 EMERGENCY DEPT VISIT MOD MDM: CPT

## 2023-06-08 PROCEDURE — 25510000001 IOPAMIDOL 61 % SOLUTION: Performed by: STUDENT IN AN ORGANIZED HEALTH CARE EDUCATION/TRAINING PROGRAM

## 2023-06-08 RX ORDER — HYDROCODONE BITARTRATE AND ACETAMINOPHEN 5; 325 MG/1; MG/1
1 TABLET ORAL EVERY 6 HOURS PRN
Status: DISCONTINUED | OUTPATIENT
Start: 2023-06-08 | End: 2023-06-09 | Stop reason: HOSPADM

## 2023-06-08 RX ORDER — ACETAMINOPHEN 325 MG/1
650 TABLET ORAL EVERY 4 HOURS PRN
Status: DISCONTINUED | OUTPATIENT
Start: 2023-06-08 | End: 2023-06-09 | Stop reason: HOSPADM

## 2023-06-08 RX ORDER — ONDANSETRON 4 MG/1
4 TABLET, ORALLY DISINTEGRATING ORAL ONCE
Status: DISCONTINUED | OUTPATIENT
Start: 2023-06-08 | End: 2023-06-08

## 2023-06-08 RX ORDER — ONDANSETRON 2 MG/ML
4 INJECTION INTRAMUSCULAR; INTRAVENOUS EVERY 6 HOURS PRN
Status: DISCONTINUED | OUTPATIENT
Start: 2023-06-08 | End: 2023-06-09 | Stop reason: HOSPADM

## 2023-06-08 RX ORDER — SODIUM CHLORIDE 0.9 % (FLUSH) 0.9 %
10 SYRINGE (ML) INJECTION AS NEEDED
Status: DISCONTINUED | OUTPATIENT
Start: 2023-06-08 | End: 2023-06-09 | Stop reason: HOSPADM

## 2023-06-08 RX ORDER — BISACODYL 10 MG
10 SUPPOSITORY, RECTAL RECTAL DAILY PRN
Status: DISCONTINUED | OUTPATIENT
Start: 2023-06-08 | End: 2023-06-09 | Stop reason: HOSPADM

## 2023-06-08 RX ORDER — POTASSIUM CHLORIDE 20 MEQ/1
20 TABLET, EXTENDED RELEASE ORAL ONCE
Status: COMPLETED | OUTPATIENT
Start: 2023-06-08 | End: 2023-06-08

## 2023-06-08 RX ORDER — ACETAMINOPHEN 160 MG/5ML
650 SOLUTION ORAL EVERY 4 HOURS PRN
Status: DISCONTINUED | OUTPATIENT
Start: 2023-06-08 | End: 2023-06-09 | Stop reason: HOSPADM

## 2023-06-08 RX ORDER — POLYETHYLENE GLYCOL 3350 17 G/17G
17 POWDER, FOR SOLUTION ORAL DAILY PRN
Status: DISCONTINUED | OUTPATIENT
Start: 2023-06-08 | End: 2023-06-09 | Stop reason: HOSPADM

## 2023-06-08 RX ORDER — ONDANSETRON 2 MG/ML
4 INJECTION INTRAMUSCULAR; INTRAVENOUS ONCE
Status: COMPLETED | OUTPATIENT
Start: 2023-06-08 | End: 2023-06-08

## 2023-06-08 RX ORDER — LABETALOL 200 MG/1
100 TABLET, FILM COATED ORAL EVERY 12 HOURS SCHEDULED
Status: DISCONTINUED | OUTPATIENT
Start: 2023-06-08 | End: 2023-06-09 | Stop reason: HOSPADM

## 2023-06-08 RX ORDER — SODIUM CHLORIDE 0.9 % (FLUSH) 0.9 %
10 SYRINGE (ML) INJECTION EVERY 12 HOURS SCHEDULED
Status: DISCONTINUED | OUTPATIENT
Start: 2023-06-08 | End: 2023-06-09 | Stop reason: HOSPADM

## 2023-06-08 RX ORDER — SODIUM CHLORIDE, SODIUM LACTATE, POTASSIUM CHLORIDE, CALCIUM CHLORIDE 600; 310; 30; 20 MG/100ML; MG/100ML; MG/100ML; MG/100ML
100 INJECTION, SOLUTION INTRAVENOUS CONTINUOUS
Status: DISCONTINUED | OUTPATIENT
Start: 2023-06-08 | End: 2023-06-09 | Stop reason: HOSPADM

## 2023-06-08 RX ORDER — FLUTICASONE PROPIONATE 50 MCG
2 SPRAY, SUSPENSION (ML) NASAL 2 TIMES DAILY
Status: DISCONTINUED | OUTPATIENT
Start: 2023-06-08 | End: 2023-06-09 | Stop reason: HOSPADM

## 2023-06-08 RX ORDER — FAMOTIDINE 10 MG/ML
20 INJECTION, SOLUTION INTRAVENOUS 2 TIMES DAILY
Status: DISCONTINUED | OUTPATIENT
Start: 2023-06-08 | End: 2023-06-09 | Stop reason: HOSPADM

## 2023-06-08 RX ORDER — ACETAMINOPHEN 650 MG/1
650 SUPPOSITORY RECTAL EVERY 4 HOURS PRN
Status: DISCONTINUED | OUTPATIENT
Start: 2023-06-08 | End: 2023-06-09 | Stop reason: HOSPADM

## 2023-06-08 RX ORDER — ACETAMINOPHEN 500 MG
1000 TABLET ORAL ONCE
Status: COMPLETED | OUTPATIENT
Start: 2023-06-08 | End: 2023-06-08

## 2023-06-08 RX ORDER — AMOXICILLIN 250 MG
2 CAPSULE ORAL 2 TIMES DAILY
Status: DISCONTINUED | OUTPATIENT
Start: 2023-06-08 | End: 2023-06-09 | Stop reason: HOSPADM

## 2023-06-08 RX ORDER — POTASSIUM CHLORIDE 750 MG/1
40 CAPSULE, EXTENDED RELEASE ORAL ONCE
Status: COMPLETED | OUTPATIENT
Start: 2023-06-08 | End: 2023-06-08

## 2023-06-08 RX ORDER — BISACODYL 5 MG/1
5 TABLET, DELAYED RELEASE ORAL DAILY PRN
Status: DISCONTINUED | OUTPATIENT
Start: 2023-06-08 | End: 2023-06-09 | Stop reason: HOSPADM

## 2023-06-08 RX ORDER — SODIUM CHLORIDE 9 MG/ML
40 INJECTION, SOLUTION INTRAVENOUS AS NEEDED
Status: DISCONTINUED | OUTPATIENT
Start: 2023-06-08 | End: 2023-06-09 | Stop reason: HOSPADM

## 2023-06-08 RX ADMIN — HYDROCODONE BITARTRATE AND ACETAMINOPHEN 1 TABLET: 5; 325 TABLET ORAL at 11:44

## 2023-06-08 RX ADMIN — FAMOTIDINE 20 MG: 10 INJECTION INTRAVENOUS at 20:20

## 2023-06-08 RX ADMIN — DOCUSATE SODIUM 50 MG AND SENNOSIDES 8.6 MG 2 TABLET: 8.6; 5 TABLET, FILM COATED ORAL at 20:19

## 2023-06-08 RX ADMIN — SODIUM CHLORIDE, POTASSIUM CHLORIDE, SODIUM LACTATE AND CALCIUM CHLORIDE 1000 ML: 600; 310; 30; 20 INJECTION, SOLUTION INTRAVENOUS at 05:50

## 2023-06-08 RX ADMIN — ONDANSETRON 4 MG: 2 INJECTION INTRAMUSCULAR; INTRAVENOUS at 05:49

## 2023-06-08 RX ADMIN — IOPAMIDOL 100 ML: 612 INJECTION, SOLUTION INTRAVENOUS at 06:40

## 2023-06-08 RX ADMIN — CEFEPIME 2 G: 2 INJECTION, POWDER, FOR SOLUTION INTRAVENOUS at 08:06

## 2023-06-08 RX ADMIN — POTASSIUM CHLORIDE 40 MEQ: 10 CAPSULE, COATED, EXTENDED RELEASE ORAL at 11:22

## 2023-06-08 RX ADMIN — HYDROCODONE BITARTRATE AND ACETAMINOPHEN 1 TABLET: 5; 325 TABLET ORAL at 20:19

## 2023-06-08 RX ADMIN — DOCUSATE SODIUM 50 MG AND SENNOSIDES 8.6 MG 2 TABLET: 8.6; 5 TABLET, FILM COATED ORAL at 11:22

## 2023-06-08 RX ADMIN — LABETALOL HYDROCHLORIDE 100 MG: 200 TABLET, FILM COATED ORAL at 11:22

## 2023-06-08 RX ADMIN — LABETALOL HYDROCHLORIDE 100 MG: 200 TABLET, FILM COATED ORAL at 20:19

## 2023-06-08 RX ADMIN — Medication 10 ML: at 11:24

## 2023-06-08 RX ADMIN — FLUTICASONE PROPIONATE 2 SPRAY: 50 SPRAY, METERED NASAL at 20:19

## 2023-06-08 RX ADMIN — ACETAMINOPHEN 1000 MG: 500 TABLET, FILM COATED ORAL at 05:49

## 2023-06-08 RX ADMIN — SODIUM CHLORIDE, POTASSIUM CHLORIDE, SODIUM LACTATE AND CALCIUM CHLORIDE 100 ML/HR: 600; 310; 30; 20 INJECTION, SOLUTION INTRAVENOUS at 11:23

## 2023-06-08 RX ADMIN — FAMOTIDINE 20 MG: 10 INJECTION INTRAVENOUS at 11:24

## 2023-06-08 RX ADMIN — ONDANSETRON 4 MG: 2 INJECTION INTRAMUSCULAR; INTRAVENOUS at 08:06

## 2023-06-08 RX ADMIN — FLUTICASONE PROPIONATE 2 SPRAY: 50 SPRAY, METERED NASAL at 11:23

## 2023-06-08 RX ADMIN — Medication 10 ML: at 20:20

## 2023-06-08 RX ADMIN — POTASSIUM CHLORIDE 20 MEQ: 1500 TABLET, EXTENDED RELEASE ORAL at 06:27

## 2023-06-08 NOTE — H&P
[facility] Medicine Services  HISTORY AND PHYSICAL    Date of Admission: 6/8/2023  Primary Care Physician: Provider, No Known    Subjective   Primary Historian: Patient .    Chief Complaint: Right flank and abdomen pain/pyelonephritis.    History of Present Illness  Patient is a 18-year-old presented to ER with nausea vomiting and fever for last 4 to 5 days.  Fever here is 101.  Patient also complaining of severe right flank pain and lower right quadrant pain.  Patient denies any dysuria.  Patient has taken Tylenol due to pain and discomfort from fever.  Patient also complain of chills symptoms and using a lot of blanket.  Patient denies of any shortness of breath or chest pain.    CT scan shows bilateral nephritis.  UA shows positive 3+ bacteria.    Patient has a past medical history of allergic rhinitis, asthma, attention deficit disorder, bipolar 1, chlamydia, depression, fetal alcohol disorder, gestational diabetic, herpes, insomnia, nocturnal enuresis, seizure .     Patient stated she does take marijuana for seizure.    Review of Systems   Constitutional:  Positive for activity change, appetite change, fatigue and fever. Negative for chills.   HENT:  Negative for hearing loss, nosebleeds, tinnitus and trouble swallowing.    Eyes:  Negative for visual disturbance.   Respiratory:  Negative for cough, chest tightness, shortness of breath and wheezing.    Cardiovascular:  Negative for chest pain, palpitations and leg swelling.   Gastrointestinal:  Negative for abdominal distention, abdominal pain, blood in stool, constipation, diarrhea, nausea and vomiting.   Endocrine: Negative for cold intolerance, heat intolerance, polydipsia, polyphagia and polyuria.   Genitourinary:  Negative for decreased urine volume, difficulty urinating, dysuria, flank pain, frequency and hematuria.   Musculoskeletal:  Negative for arthralgias, joint swelling and myalgias.   Skin:  Negative for rash.    Allergic/Immunologic: Negative for immunocompromised state.   Neurological:  Positive for weakness. Negative for dizziness, syncope, light-headedness and headaches.   Hematological:  Negative for adenopathy. Does not bruise/bleed easily.   Psychiatric/Behavioral:  Negative for confusion and sleep disturbance. The patient is not nervous/anxious.       Otherwise complete ROS reviewed and negative except as mentioned in the HPI.    Past Medical History:   Past Medical History:   Diagnosis Date    Allergic     Allergic rhinitis     Asthma     Attention deficit disorder     Bipolar 1 disorder     Chlamydia     Depression     Fetal alcohol spectrum disorder     Fetal alcohol syndrome     Gestational diabetes     Herpes     Insomnia     Nocturnal enuresis     Seizure      Past Surgical History:  Past Surgical History:   Procedure Laterality Date     SECTION N/A 7/3/2022    Procedure:  SECTION PRIMARY;  Surgeon: Jolene Bellamy DO;  Location: [facility] LABOR DELIVERY;  Service: Obstetrics;  Laterality: N/A;     SHUNT INSERTION       SHUNT REMOVAL       Social History:  reports that she has been smoking cigarettes. She has a 1.25 pack-year smoking history. She has never used smokeless tobacco. She reports current drug use. Frequency: 1.00 time per week. Drug: Marijuana. She reports that she does not drink alcohol.    Family History: She was adopted. Family history is unknown by patient.       Allergies:  Allergies   Allergen Reactions    Penicillins Anaphylaxis    Shellfish-Derived Products Anaphylaxis    Cow's Milk [Lac Bovis] Unknown (See Comments)     + on previous allergy testing.     Peanut-Containing Drug Products Unknown (See Comments)     positive on past allergy testing (paper scanned)    Shellfish-Derived Products Unknown (See Comments)     + on past allergy testing per Foster mother.     Penicillins Unknown (See Comments)     Positive on allergy testing       Medications:  Prior to  "Admission medications    Medication Sig Start Date End Date Taking? Authorizing Provider   labetalol (NORMODYNE) 100 MG tablet Take 1 tablet by mouth 3 (Three) Times a Day. 7/5/22  Yes    ferrous sulfate 325 (65 FE) MG tablet Take  by mouth Daily With Breakfast.    Nic Shah MD   fluticasone (FLONASE) 50 MCG/ACT nasal spray  12/21/16   Nic Shah MD   ibuprofen (ADVIL,MOTRIN) 800 MG tablet Take 1 tablet by mouth Every 8 (Eight) Hours As Needed for Mild Pain . 7/5/22   Rajani Singleton MD   prenatal vitamin (prenatal, CLASSIC, vitamin) tablet Take  by mouth Daily.    ProviderNic MD     I have utilized all available immediate resources to obtain, update, or review the patient's current medications (including all prescriptions, over-the-counter products, herbals, cannabis/cannabidiol products, and vitamin/mineral/dietary (nutritional) supplements).    Objective     Vital Signs: /66   Pulse 105   Temp (!) 101.6 °F (38.7 °C) (Oral)   Resp 16   Ht 167.6 cm (66\")   Wt 63.5 kg (140 lb)   LMP 05/25/2023 (Approximate)   SpO2 95%   BMI 22.60 kg/m²   Physical Exam  Vitals and nursing note reviewed.   HENT:      Head: Normocephalic.   Eyes:      Conjunctiva/sclera: Conjunctivae normal.      Pupils: Pupils are equal, round, and reactive to light.   Neck:      Vascular: No JVD.   Cardiovascular:      Rate and Rhythm: Normal rate and regular rhythm.      Heart sounds: Normal heart sounds.   Pulmonary:      Effort: Pulmonary effort is normal. No respiratory distress.      Breath sounds: Normal breath sounds. No wheezing or rales.   Chest:      Chest wall: No tenderness.   Abdominal:      General: Bowel sounds are normal. There is no distension.      Palpations: Abdomen is soft.      Tenderness: There is abdominal tenderness.      Comments: Right lower quadrant/right flank .   Musculoskeletal:         General: No tenderness or deformity. Normal range of motion.      Cervical " back: Neck supple.   Skin:     General: Skin is warm and dry.      Findings: No rash.   Neurological:      Mental Status: She is alert and oriented to person, place, and time.      Cranial Nerves: No cranial nerve deficit.      Motor: No abnormal muscle tone.      Deep Tendon Reflexes: Reflexes normal.   Psychiatric:         Mood and Affect: Mood normal.         Behavior: Behavior normal.         Thought Content: Thought content normal.         Judgment: Judgment normal.            Results Reviewed:  Lab Results (last 24 hours)       Procedure Component Value Units Date/Time    Magnesium [[id number]]  (Normal) Collected: 06/08/23 0531    Specimen: Blood from Arm, Right Updated: 06/08/23 0756     Magnesium 2.1 mg/dL     Lactic Acid, Plasma [[id number]] Collected: 06/08/23 0729    Specimen: Blood Updated: 06/08/23 0748    Urinalysis With Microscopic If Indicated (No Culture) - Urine, Clean Catch [[id number]]  (Abnormal) Collected: 06/08/23 0632    Specimen: Urine, Clean Catch Updated: 06/08/23 0650     Color, UA Dark Yellow     Appearance, UA Turbid     pH, UA 5.5     Specific Gravity, UA 1.023     Glucose, UA Negative     Ketones, UA Trace     Bilirubin, UA Small (1+)     Blood, UA Large (3+)     Protein, UA >=300 mg/dL (3+)     Leuk Esterase, UA Large (3+)     Nitrite, UA Positive     Urobilinogen, UA 1.0 E.U./dL    Urinalysis, Microscopic Only - Urine, Clean Catch [[id number]]  (Abnormal) Collected: 06/08/23 0632    Specimen: Urine, Clean Catch Updated: 06/08/23 0650     RBC, UA None Seen /HPF      WBC, UA Too Numerous to Count /HPF      Bacteria, UA 3+ /HPF      Squamous Epithelial Cells, UA 7-12 /HPF      Hyaline Casts, UA None Seen /LPF      Methodology Manual Light Microscopy    Pregnancy, Urine - Urine, Clean Catch [[id number]]  (Normal) Collected: 06/08/23 0632    Specimen: Urine, Clean Catch Updated: 06/08/23 0646     HCG, Urine QL Negative    Basic Metabolic Panel [[id number]]  (Abnormal) Collected: 06/08/23 0531     Specimen: Blood from Arm, Right Updated: 06/08/23 0600     Glucose 129 mg/dL      BUN 15 mg/dL      Creatinine 0.59 mg/dL      Sodium 135 mmol/L      Potassium 3.2 mmol/L      Chloride 96 mmol/L      CO2 25.0 mmol/L      Calcium 8.8 mg/dL      BUN/Creatinine Ratio 25.4     Anion Gap 14.0 mmol/L      eGFR 132.5 mL/min/1.73     Narrative:      GFR Normal >60  Chronic Kidney Disease <60  Kidney Failure <15      Lipase [[id number]]  (Normal) Collected: 06/08/23 0531    Specimen: Blood from Arm, Right Updated: 06/08/23 0600     Lipase 13 U/L     CBC & Differential [[id number]]  (Abnormal) Collected: 06/08/23 0531    Specimen: Blood from Arm, Right Updated: 06/08/23 0541    Narrative:      The following orders were created for panel order CBC & Differential.  Procedure                               Abnormality         Status                     ---------                               -----------         ------                     CBC Auto Differential[[id number]]        Abnormal            Final result                 Please view results for these tests on the individual orders.    CBC Auto Differential [[id number]]  (Abnormal) Collected: 06/08/23 0531    Specimen: Blood from Arm, Right Updated: 06/08/23 0541     WBC 12.59 10*3/mm3      RBC 3.92 10*6/mm3      Hemoglobin 10.8 g/dL      Hematocrit 33.4 %      MCV 85.2 fL      MCH 27.6 pg      MCHC 32.3 g/dL      RDW 14.5 %      RDW-SD 44.9 fl      MPV 11.4 fL      Platelets 240 10*3/mm3      Neutrophil % 73.8 %      Lymphocyte % 9.8 %      Monocyte % 15.6 %      Eosinophil % 0.1 %      Basophil % 0.4 %      Immature Grans % 0.3 %      Neutrophils, Absolute 9.30 10*3/mm3      Lymphocytes, Absolute 1.23 10*3/mm3      Monocytes, Absolute 1.96 10*3/mm3      Eosinophils, Absolute 0.01 10*3/mm3      Basophils, Absolute 0.05 10*3/mm3      Immature Grans, Absolute 0.04 10*3/mm3      nRBC 0.0 /100 WBC           Imaging Results (Last 24 Hours)       Procedure Component Value Units  Date/Time    CT Abdomen Pelvis With Contrast [[id number]] Collected: 06/08/23 0642     Updated: 06/08/23 0700    Narrative:      EXAMINATION: CT ABDOMEN PELVIS W CONTRAST-      6/8/2023 6:32 AM CDT     HISTORY: fever, flank pain, nausea     In order to have a CT radiation dose as low as reasonably achievable  Automated Exposure Control was utilized for adjustment of the mA and/or  KV according to patient size.     DLP in mGycm= 183     The CT scan of the abdomen and pelvis is performed after intravenous  contrast enhancement.     Images are acquired in axial plane and subsequent reconstruction in  coronal and sagittal planes.     The comparison is made with the previous study dated 03/31/2023.     The lung bases included in the study are normal.     The liver is normal. No focal intrinsic abnormality. The spleen is  borderline enlarged and measures 14 cm x 12 cm x 5.5 cm. It appears to  have increased in size since the previous study.     No radiopaque gallstones.     Common bile duct is nondilated.     Pancreas is normal.     The adrenal glands bilaterally are normal.     Moderate compression displacement of the left kidney is seen due to  moderate splenomegaly. This is more pronounced since the previous study.  There is moderate diffuse enlargement of the right kidney with  perinephric fat infiltration which was not noted in the previous study.  There are patchy areas of decreased enhancement/loss of nephrogram in  the upper and mid pole of the right kidney which was not noted in the  previous study. There may be a smaller area of focal decreased  nephrogram in the lower pole of the left kidney which was not seen in  the previous study. This may represent an acute or subacute inflammatory  process and/or phlegmon?. No calculi. No hydronephrosis. Limited  visualized ureters are unremarkable. Urinary bladder is poorly distended  and not optimally visualized or evaluated in this study.     The stomach is decompressed.  No focal abnormality. Mild dilatation of  the small bowel is seen with fluid. The cecum lies low in the pelvis on  top of the urinary bladder. Significant fluid retention is seen in the  proximal colon particularly the cecum. Moderate gas and stool in the  remaining colon is seen. No finding to suggest obstruction. No finding  to suggest appendicitis.     Moderate fullness of the uterus is seen which is moderately retroverted.  Follicular changes are seen in the ovaries bilaterally. No dominant cyst  on either side. A trace free fluid in the pelvis posterior to the  retroverted uterus.     Normal abdominal aorta and iliac arteries.     There is no evidence of abdominal or pelvic lymphadenopathy.     The images reviewed in bone window show no acute bony abnormality.       Impression:      1. Acute pyelonephritis bilateral, more severe on the right side. The  Detail is given above.                                   This report was finalized on 06/08/2023 06:57 by Dr. Brigette Tolentino MD.          I have personally reviewed and interpreted the radiology studies and ECG obtained at time of admission.     Assessment / Plan   Assessment:   Active Hospital Problems    Diagnosis     **Pyelonephritis     Bipolar 1 disorder     History of seizures     Depression        Treatment Plan  The patient will be admitted to my service here at [facility].     Polynephritis.  Leukocytosis.  Patient received cefepime in ER.  Rocephin antibiotics.  CT scan abdomen pelvic-Acute pyelonephritis bilateral, more severe on the right side.     Hyponatremia.  Lactated ringer.    Hypokalemia.  P.o. potassium.  Magnesium level.  Patient received a bolus in the ER.    Nausea.  Zofran as needed.  Pepcid.    Previous seizure.  Patient stated she take marijuana for seizure.  UDS is pending.    Allergic rhinitis.  Flonase.    Anemia.  Hold iron sulfate due to GI side effects.    Chronic pain.  Hold Motrin due to GI side  effects.    Hypertension.  Continue labetalol low-dose.    Nutrition.  Regular diet.    Blood culture.  Urine culture pending.    Medical Decision Making  Number and Complexity of problems: Nephritis.  Differential Diagnosis: None.    Conditions and Status        Condition is unchanged.     [facility] Data  External documents reviewed: Previous note.  Cardiac tracing (EKG, telemetry) interpretation: Tacky .  Radiology interpretation: CT scan  Labs reviewed: Laboratory  Any tests that were considered but not ordered: Laboratory     Decision rules/scores evaluated (example [id number], Wells, etc): None.     Discussed with: Patient and friend.     Care Planning  Shared decision making: Patient and friend.  Code status and discussions: Full code.    Disposition  Social Determinants of Health that impact treatment or disposition: Home.  Estimated length of stay is 2 to 5 days..     I confirmed that the patient's advanced care plan is present, code status is documented, and a surrogate decision maker is listed in the patient's medical record.     The patient's surrogate decision maker is Petar Díaz friend.     The patient was seen and examined by me on 6/8/2023 at 7:45 AM..    Electronically signed by Brandan Vásquez MD, 06/08/23, 07:57 CDT.

## 2023-06-08 NOTE — ED PROVIDER NOTES
Subjective   History of Present Illness  Patient states that she has been having nausea and vomiting and fever that started 4 to 5 days ago.  States that she is also been having severe right lower flank and right lower quadrant pain.  States that she has not been having any dysuria that she can think of.  States that she has tried Tylenol of breath and was not helping.  States that she has chills and has been using a lot of blankets as well.  Denies any chest pain or shortness of breath or any leg swelling.  Denies any headaches or vision changes that are new or any new numbness or tingling    Review of Systems   All other systems reviewed and are negative.    Past Medical History:   Diagnosis Date    Allergic     Allergic rhinitis     Asthma     Attention deficit disorder     Bipolar 1 disorder     Chlamydia     Depression     Fetal alcohol spectrum disorder     Fetal alcohol syndrome     Gestational diabetes     Herpes     Insomnia     Nocturnal enuresis     Seizure        Allergies   Allergen Reactions    Penicillins Anaphylaxis    Shellfish-Derived Products Anaphylaxis    Cow's Milk [Lac Bovis] Unknown (See Comments)     + on previous allergy testing.     Peanut-Containing Drug Products Unknown (See Comments)     positive on past allergy testing (paper scanned)    Shellfish-Derived Products Unknown (See Comments)     + on past allergy testing per Foster mother.     Penicillins Unknown (See Comments)     Positive on allergy testing       Past Surgical History:   Procedure Laterality Date     SECTION N/A 7/3/2022    Procedure:  SECTION PRIMARY;  Surgeon: Jolene Bellamy DO;  Location: [facility] LABOR DELIVERY;  Service: Obstetrics;  Laterality: N/A;     SHUNT INSERTION       SHUNT REMOVAL         Family History   Adopted: Yes   Family history unknown: Yes       Social History     Socioeconomic History    Marital status: Single   Tobacco Use    Smoking status: Every Day     Packs/day: 0.25      Years: 5.00     Pack years: 1.25     Types: Cigarettes    Smokeless tobacco: Never   Vaping Use    Vaping Use: Never used   Substance and Sexual Activity    Alcohol use: No    Drug use: Yes     Frequency: 1.0 times per week     Types: Marijuana    Sexual activity: Yes           Objective   Physical Exam  Vitals and nursing note reviewed.   Constitutional:       General: She is in acute distress (due to abdominal pain).      Appearance: Normal appearance. She is not toxic-appearing or diaphoretic.   HENT:      Head: Normocephalic and atraumatic.      Right Ear: External ear normal.      Left Ear: External ear normal.      Nose: Nose normal.      Mouth/Throat:      Mouth: Mucous membranes are moist.   Eyes:      General:         Right eye: No discharge.         Left eye: No discharge.      Extraocular Movements: Extraocular movements intact.      Conjunctiva/sclera: Conjunctivae normal.   Cardiovascular:      Rate and Rhythm: Regular rhythm. Tachycardia present.      Pulses: Normal pulses.   Pulmonary:      Effort: Pulmonary effort is normal. No respiratory distress.      Breath sounds: No rhonchi.   Abdominal:      General: Abdomen is flat. There is no distension.      Tenderness: There is abdominal tenderness. There is right CVA tenderness. There is no guarding or rebound.   Musculoskeletal:         General: No deformity or signs of injury.   Skin:     General: Skin is warm.      Capillary Refill: Capillary refill takes less than 2 seconds.      Coloration: Skin is not jaundiced.   Neurological:      Mental Status: She is alert and oriented to person, place, and time. Mental status is at baseline.   Psychiatric:         Mood and Affect: Mood normal.         Behavior: Behavior normal.         Thought Content: Thought content normal.         Judgment: Judgment normal.       Procedures           ED Course                                           Medical Decision Making  Dani Trinidad is a very pleasant 18 y.o.  female who presents to the ED for abdominal pain, fever, and nausea.     Patient was non-toxic and not-ill appearing on arrival. Vital signs stable on arrival. Repeat temperature elevated.     Patient's presentation raises suspicion for differentials including, but not limited to, diverticulitis, uti, pyelonephritis, bacteremia.     External (non-ED) record review: none    Given this, Dani was placed on the monitor. Laboratory studies and imaging studies were ordered including cbc, cmp, ua, CT abdomen/pelvis, blood cultures.     Dani patient was given fluids, pain medication, and antibiotics for symptomatic relief.    Labs were reviewed and pertinent for infected urine and elevated WBC as well as evidence of pyelonephritis on the CT scan. On re-evaluation, patient remained hemodynamically stable and appeared to be comfortable and in no acute distress.    Given findings described above, patient's presentation is most likely related to her pyelonephritis. At this point, after reviewing the workup, I have a low suspicion for dissection or diverticulitis with reassuring CT scan.    I had a discussion regarding the workup, results so far, and my impression so far. I said that I I would like to admit her for further care for pyelonephritis with further fluids and pain medication antibiotics.  She is agreeable with this plan.  She was admitted in stable condition after we discussed her case with the hospitalist on-call.    Signed by:   Sony Pedraza MD 6/8/2023 07:10 CDT   Emergency Medicine Physician    Dragon disclaimer:  Part of this note may be an electronic transcription/translation of spoken language to printed text using the Dragon Dictation System.         Problems Addressed:  Leukocytosis, unspecified type: complicated acute illness or injury  Pyelonephritis: complicated acute illness or injury    Amount and/or Complexity of Data Reviewed  Labs: ordered.  Radiology: ordered.    Risk  OTC drugs.  Prescription  drug management.  Decision regarding hospitalization.        Final diagnoses:   Pyelonephritis   Leukocytosis, unspecified type       ED Disposition  ED Disposition       ED Disposition   Decision to Admit    Condition   --    Comment   Level of Care: Med/Surg [1]   Diagnosis: Pyelonephritis [824102]   Admitting Physician: JANES MORALES [[id number]]   Attending Physician: JANES MORALES [[id number]]   Certification: I Certify That Inpatient Hospital Services Are Medically Necessary For Greater Than 2 Midnights                 No follow-up provider specified.       Medication List      No changes were made to your prescriptions during this visit.            Sony Pedraza MD  06/09/23 0807

## 2023-06-08 NOTE — PLAN OF CARE
Goal Outcome Evaluation: Patient admitted from ER with pyelonephritis, leukocytosis, nausea, vomiting and fever for 4 days. Patient has 3+ bacteria in her urine. No complaints of pain at this time. Patient safety to be maintained this shift, continue to monitor and report abnormal to provider.

## 2023-06-09 ENCOUNTER — HOSPITAL ENCOUNTER (EMERGENCY)
Age: 18
Discharge: HOME | End: 2023-06-09

## 2023-06-09 VITALS
OXYGEN SATURATION: 98 % | HEART RATE: 81 BPM | WEIGHT: 140 LBS | HEIGHT: 66 IN | TEMPERATURE: 99.4 F | RESPIRATION RATE: 18 BRPM | BODY MASS INDEX: 22.5 KG/M2 | SYSTOLIC BLOOD PRESSURE: 110 MMHG | DIASTOLIC BLOOD PRESSURE: 57 MMHG

## 2023-06-09 VITALS
OXYGEN SATURATION: 95 % | WEIGHT: 112 LBS | TEMPERATURE: 98.2 F | DIASTOLIC BLOOD PRESSURE: 73 MMHG | HEART RATE: 83 BPM | RESPIRATION RATE: 18 BRPM | SYSTOLIC BLOOD PRESSURE: 124 MMHG

## 2023-06-09 DIAGNOSIS — N10 ACUTE PYELONEPHRITIS: Primary | ICD-10-CM

## 2023-06-09 PROBLEM — A41.50 SEPSIS DUE TO GRAM-NEGATIVE UTI: Status: ACTIVE | Noted: 2023-06-09

## 2023-06-09 PROBLEM — N39.0 SEPSIS DUE TO GRAM-NEGATIVE UTI: Status: ACTIVE | Noted: 2023-06-09

## 2023-06-09 LAB
BACTERIA BLD CULT: ABNORMAL
BOTTLE TYPE: ABNORMAL

## 2023-06-09 PROCEDURE — 2500000003 HC RX 250 WO HCPCS: Performed by: NURSE PRACTITIONER

## 2023-06-09 PROCEDURE — G0378 HOSPITAL OBSERVATION PER HR: HCPCS

## 2023-06-09 PROCEDURE — 96361 HYDRATE IV INFUSION ADD-ON: CPT

## 2023-06-09 PROCEDURE — 6360000002 HC RX W HCPCS: Performed by: NURSE PRACTITIONER

## 2023-06-09 RX ORDER — KETOROLAC TROMETHAMINE 10 MG/1
10 TABLET, FILM COATED ORAL EVERY 6 HOURS PRN
Status: DISCONTINUED | OUTPATIENT
Start: 2023-06-09 | End: 2023-06-09 | Stop reason: HOSPADM

## 2023-06-09 RX ORDER — CEFDINIR 300 MG/1
300 CAPSULE ORAL 2 TIMES DAILY
Qty: 18 CAPSULE | Refills: 0 | Status: SHIPPED | OUTPATIENT
Start: 2023-06-09 | End: 2023-06-09 | Stop reason: SDUPTHER

## 2023-06-09 RX ORDER — ONDANSETRON 4 MG/1
4 TABLET, ORALLY DISINTEGRATING ORAL 3 TIMES DAILY PRN
Qty: 21 TABLET | Refills: 0 | Status: SHIPPED | OUTPATIENT
Start: 2023-06-09

## 2023-06-09 RX ORDER — CEFDINIR 300 MG/1
300 CAPSULE ORAL 2 TIMES DAILY
Qty: 18 CAPSULE | Refills: 0 | Status: SHIPPED | OUTPATIENT
Start: 2023-06-09 | End: 2023-06-18

## 2023-06-09 RX ADMIN — KETOROLAC TROMETHAMINE 10 MG: 10 TABLET, FILM COATED ORAL at 01:48

## 2023-06-09 RX ADMIN — CEFTRIAXONE SODIUM 1000 MG: 1 INJECTION, POWDER, FOR SOLUTION INTRAMUSCULAR; INTRAVENOUS at 20:53

## 2023-06-09 RX ADMIN — SODIUM CHLORIDE, POTASSIUM CHLORIDE, SODIUM LACTATE AND CALCIUM CHLORIDE 100 ML/HR: 600; 310; 30; 20 INJECTION, SOLUTION INTRAVENOUS at 00:08

## 2023-06-09 ASSESSMENT — ENCOUNTER SYMPTOMS
BACK PAIN: 0
VOMITING: 0
ABDOMINAL PAIN: 0
SHORTNESS OF BREATH: 0
DIARRHEA: 0
NAUSEA: 1
COUGH: 0

## 2023-06-09 NOTE — PLAN OF CARE
Goal Outcome Evaluation:  Plan of Care Reviewed With: patient, family        Progress: no change  Outcome Evaluation: Pt A&Ox4. Up ad jasper. Friends at BS. Refusing care. Md aware. DC home in progress. Will cont to monitor.

## 2023-06-09 NOTE — NURSING NOTE
"Pt received new orders for abx this morning. Went in to start new IV on pt. Upon waking pt she stated \"ugh why do you keep bothering me\". Explained to pt the need for new IV and abx. Pt stated she didn't have a fever right now so she didn't need them at the moment. Informed her it was for the positive BC. She said \"Can I refuse this, my arm is hurting right now\". Educated on need for abx and pt still decided to refuse.   "

## 2023-06-09 NOTE — PROGRESS NOTES
"Pharmacy Dosing Service  Pharmacokinetics  Vancomycin Initial Evaluation  Assessment/Action/Plan:  Loading dose?: none  Current Order: Vancomycin 1250 mg IVPB every 12 hours  Current end date:06/14/2023  Levels: first trough 06/10/2023  Additional antimicrobial agent(s): Ceftriaxone 2 g    Vancomycin dosage initiated based on population pharmacokinetic parameters. Pharmacy will continue to follow daily and adjust dose accordingly.     Subjective:  Dani Trinidad is a 18 y.o. female with a Vancomycin \"Pharmacy to Dose\" consult for the treatment of bacteremia , day 1 of 5 of treatment.    AUC Model Data:  Loading dose: N/A  Regimen: 1250 mg IV every 12 hours.  Start time: 06:35 on 06/09/2023  Exposure target: AUC24 (range)400-600 mg/L.hr   AUC24,ss: 461 mg/L.hr  PAUC*: 65 %  Ctrough,ss: 12.2 mg/L  Pconc*: 18 %  Tox.: 7 %    Objective:  Ht: 167.6 cm (66\"); Wt: 63.5 kg (140 lb)  Estimated Creatinine Clearance: 152.5 mL/min (by C-G formula based on SCr of 0.59 mg/dL).   Creatinine   Date Value Ref Range Status   06/08/2023 0.59 0.57 - 1.00 mg/dL Final   03/31/2023 0.38 (L) 0.57 - 1.00 mg/dL Final   01/12/2023 0.48 (L) 0.57 - 1.00 mg/dL Final      Lab Results   Component Value Date    WBC 12.59 (H) 06/08/2023    WBC 7.31 03/31/2023    WBC 7.27 01/12/2023      Baseline culture results:  Microbiology Results (last 10 days)       Procedure Component Value - Date/Time    Blood Culture - Blood, Arm, Right [[id number]]  (Abnormal) Collected: 06/08/23 0740    Lab Status: Preliminary result Specimen: Blood from Arm, Right Updated: 06/09/23 0511     Blood Culture Abnormal Stain     Gram Stain Anaerobic Bottle Gram positive cocci      Aerobic Bottle Gram positive cocci            Dex Martinez, Carline  06/09/23 05:37 CDT    "

## 2023-06-09 NOTE — PLAN OF CARE
Problem: Adult Inpatient Plan of Care  Goal: Plan of Care Review  Outcome: Ongoing, Progressing  Flowsheets (Taken 6/9/2023 0500)  Progress: no change  Plan of Care Reviewed With: patient  Outcome Evaluation: Pt is A&Ox4. Pt had migraine episode early this AM. Toradol ordered per Dr. Melgar. relief noted. Screened positive for simple sepsis. Pt was stating IV was hurting and that it was related to the fluids she was receiving. Her and family didn't want them to be running anymore and wanted IV to be removed until later in the morning so she could get some rest. Dr. Melgar notified of this as well.. Voiding. VSS. Safety maintained.

## 2023-06-09 NOTE — DISCHARGE SUMMARY
[facility] Medicine Services  DISCHARGE SUMMARY       Date of Admission: 6/8/2023  Date of Discharge:  6/9/2023  Primary Care Physician: Provider, No Known    Presenting Problem/History of Present Illness:  Nausea and vomiting    Final Discharge Diagnoses:  Active Hospital Problems    Diagnosis     **Acute bilateral pyelonephritis     Sepsis due to UTI     Bipolar 1 disorder     History of seizures     Depression        Consults: none.    Procedures Performed: none.    Pertinent Test Results:       Imaging Results (All)       Procedure Component Value Units Date/Time    CT Abdomen Pelvis With Contrast [[id number]] Collected: 06/08/23 0642     Updated: 06/08/23 0700    Narrative:      EXAMINATION: CT ABDOMEN PELVIS W CONTRAST-      6/8/2023 6:32 AM CDT     HISTORY: fever, flank pain, nausea     In order to have a CT radiation dose as low as reasonably achievable  Automated Exposure Control was utilized for adjustment of the mA and/or  KV according to patient size.     DLP in mGycm= 183     The CT scan of the abdomen and pelvis is performed after intravenous  contrast enhancement.     Images are acquired in axial plane and subsequent reconstruction in  coronal and sagittal planes.     The comparison is made with the previous study dated 03/31/2023.     The lung bases included in the study are normal.     The liver is normal. No focal intrinsic abnormality. The spleen is  borderline enlarged and measures 14 cm x 12 cm x 5.5 cm. It appears to  have increased in size since the previous study.     No radiopaque gallstones.     Common bile duct is nondilated.     Pancreas is normal.     The adrenal glands bilaterally are normal.     Moderate compression displacement of the left kidney is seen due to  moderate splenomegaly. This is more pronounced since the previous study.  There is moderate diffuse enlargement of the right kidney with  perinephric fat infiltration which was not noted  in the previous study.  There are patchy areas of decreased enhancement/loss of nephrogram in  the upper and mid pole of the right kidney which was not noted in the  previous study. There may be a smaller area of focal decreased  nephrogram in the lower pole of the left kidney which was not seen in  the previous study. This may represent an acute or subacute inflammatory  process and/or phlegmon?. No calculi. No hydronephrosis. Limited  visualized ureters are unremarkable. Urinary bladder is poorly distended  and not optimally visualized or evaluated in this study.     The stomach is decompressed. No focal abnormality. Mild dilatation of  the small bowel is seen with fluid. The cecum lies low in the pelvis on  top of the urinary bladder. Significant fluid retention is seen in the  proximal colon particularly the cecum. Moderate gas and stool in the  remaining colon is seen. No finding to suggest obstruction. No finding  to suggest appendicitis.     Moderate fullness of the uterus is seen which is moderately retroverted.  Follicular changes are seen in the ovaries bilaterally. No dominant cyst  on either side. A trace free fluid in the pelvis posterior to the  retroverted uterus.     Normal abdominal aorta and iliac arteries.     There is no evidence of abdominal or pelvic lymphadenopathy.     The images reviewed in bone window show no acute bony abnormality.       Impression:      1. Acute pyelonephritis bilateral, more severe on the right side. The  Detail is given above.                                   This report was finalized on 06/08/2023 06:57 by Dr. Brigette Tolentino MD.          LAB RESULTS:      Lab 06/08/23  0729 06/08/23  0531   WBC  --  12.59*   HEMOGLOBIN  --  10.8*   HEMATOCRIT  --  33.4*   PLATELETS  --  240   NEUTROS ABS  --  9.30*   IMMATURE GRANS (ABS)  --  0.04   LYMPHS ABS  --  1.23   MONOS ABS  --  1.96*   EOS ABS  --  0.01   MCV  --  85.2   LACTATE 0.9  --          Lab 06/08/23  0531   SODIUM  135*   POTASSIUM 3.2*   CHLORIDE 96*   CO2 25.0   ANION GAP 14.0   BUN 15   CREATININE 0.59   EGFR 132.5   GLUCOSE 129*   CALCIUM 8.8   MAGNESIUM 2.1         Lab 06/08/23  0531   LIPASE 13                     Brief Urine Lab Results  (Last result in the past 365 days)        Color   Clarity   Blood   Leuk Est   Nitrite   Protein   CREAT   Urine HCG        06/08/23 0632 Dark Yellow   Turbid   Large (3+)   Large (3+)   Positive   >=300 mg/dL (3+)           06/08/23 0632               Negative             Microbiology Results (last 10 days)       Procedure Component Value - Date/Time    Blood Culture - Blood, Arm, Right [[id number]]  (Abnormal) Collected: 06/08/23 0740    Lab Status: Preliminary result Specimen: Blood from Arm, Right Updated: 06/09/23 0511     Blood Culture Abnormal Stain     Gram Stain Anaerobic Bottle Gram positive cocci      Aerobic Bottle Gram positive cocci    Blood Culture ID, PCR - Blood, Arm, Right [[id number]]  (Abnormal) Collected: 06/08/23 0740    Lab Status: Final result Specimen: Blood from Arm, Right Updated: 06/09/23 0625     BCID, PCR Staph spp, not aureus or lugdunensis. Identification by BCID2 PCR.     BOTTLE TYPE Aerobic Bottle    Blood Culture - Blood, Arm, Right [[id number]]  (Normal) Collected: 06/08/23 0729    Lab Status: Preliminary result Specimen: Blood from Arm, Right Updated: 06/09/23 0815     Blood Culture No growth at 24 hours            Hospital Course:   Ms. Trinidad is a 18-year-old female that presented to [facility] on 6/8 with 4-5 history of nausea, vomiting and fever.  She had complained of right lower flank and right lower quadrant pain.  Work-up in the ER revealed acute bilateral pyelonephritis, more severe on the right side.  She was given IV cefepime.  Febrile 101.6 °F, tachycardic with WBC 12.  Lactate 0.9.  She met criteria for simple sepsis.  She was given 1 L IV fluid bolus.    Overnight patient lost IV access.  Patient refused for IV to be replaced by  "night shift RN and dayshift RN today. She also refused this morning labs.  She has been afebrile since last night.  Blood culture preliminary shows staph, not aureus or lugduenesis -suspect contaminant.  Second blood culture with no growth at 24 hours.  States overall she is feeling much better today.  Denies any flank pain.  Denies nausea, vomiting.  She very much wants to go home.  Does have an allergy listed to penicillin.  She tolerated cefepime.  Will transition biotics to Omnicef to complete a total of 10 days antibiotic therapy.  Encouraged her to follow-up with her primary care provider in 1 week.    Physical Exam on Discharge:  /57 (BP Location: Left arm, Patient Position: Lying)   Pulse 81   Temp 99.4 °F (37.4 °C) (Oral)   Resp 18   Ht 167.6 cm (66\")   Wt 63.5 kg (140 lb)   LMP 05/25/2023 (Approximate)   SpO2 98%   BMI 22.60 kg/m²   Physical Exam  Vitals reviewed.   Constitutional:       General: She is not in acute distress.     Appearance: She is not toxic-appearing.      Comments: Sitting up in bed with significant other at bedside.  No acute distress.  On room air.  Discussed with her nurse Dawna.   HENT:      Head: Normocephalic and atraumatic.      Mouth/Throat:      Mouth: Mucous membranes are moist.      Pharynx: Oropharynx is clear.   Eyes:      Extraocular Movements: Extraocular movements intact.      Conjunctiva/sclera: Conjunctivae normal.      Pupils: Pupils are equal, round, and reactive to light.   Cardiovascular:      Rate and Rhythm: Normal rate and regular rhythm.      Pulses: Normal pulses.   Pulmonary:      Effort: Pulmonary effort is normal. No respiratory distress.      Breath sounds: Normal breath sounds. No wheezing or rhonchi.   Abdominal:      General: Bowel sounds are normal. There is no distension.      Palpations: Abdomen is soft.      Tenderness: There is no abdominal tenderness.   Musculoskeletal:         General: No swelling or tenderness. Normal range of " motion.      Cervical back: Normal range of motion and neck supple. No muscular tenderness.   Skin:     General: Skin is warm and dry.      Findings: No erythema or rash.   Neurological:      General: No focal deficit present.      Mental Status: She is alert and oriented to person, place, and time.      Cranial Nerves: No cranial nerve deficit.      Motor: No weakness.   Psychiatric:         Mood and Affect: Mood normal.         Behavior: Behavior normal.       Condition on Discharge: medically stable.    Discharge Disposition:  Home or Self Care    Discharge Medications:     Discharge Medications        New Medications        Instructions Start Date   cefdinir 300 MG capsule  Commonly known as: OMNICEF   300 mg, Oral, 2 Times Daily             Continue These Medications        Instructions Start Date   ferrous sulfate 325 (65 FE) MG tablet   325 mg, Oral, Daily With Breakfast      fluticasone 50 MCG/ACT nasal spray  Commonly known as: FLONASE   1 spray, Nasal, Daily      labetalol 100 MG tablet  Commonly known as: NORMODYNE   100 mg, Oral, 3 Times Daily      prenatal (CLASSIC) vitamin  tablet  Generic drug: prenatal vitamin   1 tablet, Oral, Daily               Discharge Diet:   Diet Instructions       Diet: Regular/House Diet; Regular Texture (IDDSI 7); Thin (IDDSI 0)      Discharge Diet: Regular/House Diet    Texture: Regular Texture (IDDSI 7)    Fluid Consistency: Thin (IDDSI 0)            Activity at Discharge:   Activity Instructions       Activity as Tolerated              Follow-up Appointments:   1.  Follow-up with primary care provider in 1 week.    Test Results Pending at Discharge: Blood culture with no growth to date.  We will follow urine and blood culture until completion.    Electronically signed by RAUL Araiza, 06/09/23, 10:08 CDT.    Time: 35 minutes.

## 2023-06-09 NOTE — NURSING NOTE
"Pt refused vitals this morning per nurse aid. Went in to attempt to start new IV and give meds and was told \"Not right now\".  "

## 2023-06-10 ENCOUNTER — READMISSION MANAGEMENT (OUTPATIENT)
Dept: CALL CENTER | Facility: HOSPITAL | Age: 18
End: 2023-06-10
Payer: COMMERCIAL

## 2023-06-10 LAB
BACTERIA SPEC AEROBE CULT: ABNORMAL
BACTERIA SPEC AEROBE CULT: ABNORMAL
GRAM STN SPEC: ABNORMAL
GRAM STN SPEC: ABNORMAL
ISOLATED FROM: ABNORMAL

## 2023-06-10 NOTE — ED PROVIDER NOTES
[facility] EMERGENCY DEPT  EMERGENCY DEPARTMENT ENCOUNTER      Pt Name: Alejandra Pruitt  MRN: [id number]  [place] 2005  Date of evaluation: 6/9/2023  Provider: ALTAGRACIA Ortiz CNP    CHIEF COMPLAINT       Chief Complaint   Patient presents with    Other     Was admitted to [facility] yesterday, diagnosed with kidney infection; states that she was discharged from there today and that they were \"not doing a good job there\", states that she was given something that she was allergic to to treat the UTI; pt allergic to PCN prescribed omnicef has not taken any          HISTORY OF PRESENT ILLNESS   (Location/Symptom, Timing/Onset, Context/Setting, Quality, Duration, Modifying Factors, Severity)  Note limiting factors. Alejandra Pruitt is a 19 y.o. female who presents to the emergency department with her SO with concern for pyelonephritis. She was admitted overnight with this at [facility]. She was discharged this am on cefdinir but didn't have a ride to get it filled. Since discharge she has continued to have nausea with occasional dry heaves. No additional fever. No additional pain. She tells me she does have a ride to [place] if needed    Chart Review-[facility] admission 6/8-6/9    HPI    Nursing Notes were reviewed. REVIEW OF SYSTEMS    (2-9 systems for level 4, 10 or more for level 5)     Review of Systems   Constitutional:  Negative for chills and fever. HENT:  Negative for congestion. Respiratory:  Negative for cough and shortness of breath. Cardiovascular:  Negative for chest pain and palpitations. Gastrointestinal:  Positive for nausea. Negative for abdominal pain, diarrhea and vomiting. Genitourinary:  Positive for flank pain (improving). Negative for dysuria, frequency and urgency. Musculoskeletal:  Negative for back pain and neck pain. Neurological:  Negative for dizziness, syncope, weakness, light-headedness and headaches.      Except as noted above the remainder of the review of

## 2023-06-10 NOTE — OUTREACH NOTE
Prep Survey    Flowsheet Row Responses   [facility] facility patient discharged from? [place]   Is LACE score < 7 ? No   Eligibility Readm Mgmt   Discharge diagnosis Acute bilateral pyelonephritis, sepsis d/t UTI   Does the patient have one of the following disease processes/diagnoses(primary or secondary)? Sepsis   Does the patient have Home health ordered? No   Is there a DME ordered? No   Prep survey completed? Yes          Gely VERDUZCO - Registered Nurse

## 2023-06-10 NOTE — DISCHARGE INSTRUCTIONS
Fill your prescription for omnicef (cefdinir) and take as prescribed. Fill your script for nausea medicine and take as prescribed. Return here for any change or worsening of symptoms.

## 2023-06-13 LAB — BACTERIA SPEC AEROBE CULT: NORMAL

## 2023-06-14 ENCOUNTER — READMISSION MANAGEMENT (OUTPATIENT)
Dept: CALL CENTER | Facility: HOSPITAL | Age: 18
End: 2023-06-14
Payer: MEDICAID

## 2023-06-14 NOTE — OUTREACH NOTE
Sepsis Week 1 Survey      Flowsheet Row Responses   [facility] facility patient discharged from? [place]   Does the patient have one of the following disease processes/diagnoses(primary or secondary)? Sepsis   Week 1 attempt successful? No   Unsuccessful attempts Attempt 1            Lashell Cotton Licensed Nurse

## 2023-06-19 ENCOUNTER — READMISSION MANAGEMENT (OUTPATIENT)
Dept: CALL CENTER | Facility: HOSPITAL | Age: 18
End: 2023-06-19

## 2023-06-19 NOTE — OUTREACH NOTE
Sepsis Week 1 Survey      Flowsheet Row Responses   [facility] facility patient discharged from? [place]   Does the patient have one of the following disease processes/diagnoses(primary or secondary)? Sepsis   Week 1 attempt successful? No   Unsuccessful attempts Attempt 2   Revoke Phone number issues            MADISON VERDUZCO - Registered Nurse

## 2023-08-10 ENCOUNTER — HOSPITAL ENCOUNTER (EMERGENCY)
Age: 18
Discharge: HOME | End: 2023-08-10
Attending: EMERGENCY MEDICINE

## 2023-08-10 VITALS
DIASTOLIC BLOOD PRESSURE: 67 MMHG | RESPIRATION RATE: 19 BRPM | TEMPERATURE: 98.1 F | OXYGEN SATURATION: 98 % | SYSTOLIC BLOOD PRESSURE: 115 MMHG | HEART RATE: 100 BPM

## 2023-08-10 DIAGNOSIS — F41.9 ANXIETY WITH AGITATION: Primary | ICD-10-CM

## 2023-08-10 DIAGNOSIS — T74.21XA REPORTED SEXUAL ASSAULT OF ADULT: ICD-10-CM

## 2023-08-10 DIAGNOSIS — R45.1 ANXIETY WITH AGITATION: Primary | ICD-10-CM

## 2023-08-10 PROCEDURE — 99283 EMERGENCY DEPT VISIT LOW MDM: CPT

## 2023-08-10 NOTE — ED NOTES
Pt refuses to answer any questions during triage. She just keeps repeating \"FAFO\" (Fuck around and find out). Pt arrives with PO at bedside in handcuffs.       Haroon Bermudez RN  08/10/23 1411 [address], RN  08/10/23 1007

## 2023-08-10 NOTE — ED NOTES
Attempted to call pt mother, per pt request. No answer.  Dr. Gee Headley stated if pt has a safe place to go, she will be able to be d/c'd.      Jane Manley RN  08/10/23 0418

## 2023-08-10 NOTE — ED NOTES
Pt has safe home to go to per pt. SW will get pt cab voucher.       Alondra Martinez RN  08/10/23 2797
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
## 2023-08-10 NOTE — CARE COORDINATION
Cab voucher provided to [address] [place] [address] jose eduardo Palacio [id number] 8.5 miles $17 though Federico Jacome
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
## 2023-08-13 ENCOUNTER — HOSPITAL ENCOUNTER (EMERGENCY)
Facility: HOSPITAL | Age: 18
Discharge: LEFT BEFORE DISCHARGE | End: 2023-08-13
Attending: EMERGENCY MEDICINE | Admitting: EMERGENCY MEDICINE

## 2023-08-13 ENCOUNTER — APPOINTMENT (OUTPATIENT)
Dept: CT IMAGING | Facility: HOSPITAL | Age: 18
End: 2023-08-13

## 2023-08-13 ENCOUNTER — HOSPITAL ENCOUNTER (EMERGENCY)
Facility: HOSPITAL | Age: 18
Discharge: HOME | End: 2023-08-13
Attending: STUDENT IN AN ORGANIZED HEALTH CARE EDUCATION/TRAINING PROGRAM | Admitting: STUDENT IN AN ORGANIZED HEALTH CARE EDUCATION/TRAINING PROGRAM

## 2023-08-13 VITALS
HEART RATE: 89 BPM | TEMPERATURE: 98.1 F | SYSTOLIC BLOOD PRESSURE: 128 MMHG | OXYGEN SATURATION: 99 % | WEIGHT: 120 LBS | HEIGHT: 66 IN | RESPIRATION RATE: 12 BRPM | BODY MASS INDEX: 19.29 KG/M2 | DIASTOLIC BLOOD PRESSURE: 84 MMHG

## 2023-08-13 VITALS
WEIGHT: 120 LBS | OXYGEN SATURATION: 100 % | BODY MASS INDEX: 19.29 KG/M2 | HEIGHT: 66 IN | DIASTOLIC BLOOD PRESSURE: 52 MMHG | HEART RATE: 61 BPM | SYSTOLIC BLOOD PRESSURE: 116 MMHG | TEMPERATURE: 97.7 F | RESPIRATION RATE: 20 BRPM

## 2023-08-13 DIAGNOSIS — R10.9 ABDOMINAL PAIN, UNSPECIFIED ABDOMINAL LOCATION: ICD-10-CM

## 2023-08-13 DIAGNOSIS — K52.9 GASTROENTERITIS: Primary | ICD-10-CM

## 2023-08-13 DIAGNOSIS — R19.7 DIARRHEA, UNSPECIFIED TYPE: Primary | ICD-10-CM

## 2023-08-13 DIAGNOSIS — N83.201 CYST OF RIGHT OVARY: ICD-10-CM

## 2023-08-13 LAB
ALBUMIN SERPL-MCNC: 4.6 G/DL (ref 3.5–5.2)
ALBUMIN/GLOB SERPL: 1.7 G/DL
ALP SERPL-CCNC: 67 U/L (ref 39–117)
ALT SERPL W P-5'-P-CCNC: 14 U/L (ref 1–33)
ANION GAP SERPL CALCULATED.3IONS-SCNC: 10 MMOL/L (ref 5–15)
AST SERPL-CCNC: 15 U/L (ref 1–32)
BACTERIA UR QL AUTO: ABNORMAL /HPF
BILIRUB SERPL-MCNC: <0.2 MG/DL (ref 0–1.2)
BILIRUB UR QL STRIP: NEGATIVE
BUN SERPL-MCNC: 13 MG/DL (ref 6–20)
BUN/CREAT SERPL: 26 (ref 7–25)
CALCIUM SPEC-SCNC: 9.5 MG/DL (ref 8.6–10.5)
CHLORIDE SERPL-SCNC: 107 MMOL/L (ref 98–107)
CLARITY UR: CLEAR
CO2 SERPL-SCNC: 28 MMOL/L (ref 22–29)
COLOR UR: YELLOW
CREAT SERPL-MCNC: 0.5 MG/DL (ref 0.57–1)
D-LACTATE SERPL-SCNC: 1.2 MMOL/L (ref 0.5–2)
DEPRECATED RDW RBC AUTO: 50.5 FL (ref 37–54)
EGFRCR SERPLBLD CKD-EPI 2021: 137.9 ML/MIN/1.73
ELLIPTOCYTES BLD QL SMEAR: ABNORMAL
EOSINOPHIL # BLD MANUAL: 0.45 10*3/MM3 (ref 0–0.4)
EOSINOPHIL NFR BLD MANUAL: 4.2 % (ref 0.3–6.2)
ERYTHROCYTE [DISTWIDTH] IN BLOOD BY AUTOMATED COUNT: 15 % (ref 12.3–15.4)
GLOBULIN UR ELPH-MCNC: 2.7 GM/DL
GLUCOSE SERPL-MCNC: 91 MG/DL (ref 65–99)
GLUCOSE UR STRIP-MCNC: NEGATIVE MG/DL
HCG SERPL QL: NEGATIVE
HCG SERPL QL: NEGATIVE
HCT VFR BLD AUTO: 38.8 % (ref 34–46.6)
HGB BLD-MCNC: 12.1 G/DL (ref 12–15.9)
HGB UR QL STRIP.AUTO: ABNORMAL
HOLD SPECIMEN: NORMAL
HOLD SPECIMEN: NORMAL
HYALINE CASTS UR QL AUTO: ABNORMAL /LPF
KETONES UR QL STRIP: NEGATIVE
LEUKOCYTE ESTERASE UR QL STRIP.AUTO: ABNORMAL
LIPASE SERPL-CCNC: 37 U/L (ref 13–60)
LYMPHOCYTES # BLD MANUAL: 3.98 10*3/MM3 (ref 0.7–3.1)
LYMPHOCYTES NFR BLD MANUAL: 3.1 % (ref 5–12)
MCH RBC QN AUTO: 28.8 PG (ref 26.6–33)
MCHC RBC AUTO-ENTMCNC: 31.2 G/DL (ref 31.5–35.7)
MCV RBC AUTO: 92.4 FL (ref 79–97)
MONOCYTES # BLD: 0.33 10*3/MM3 (ref 0.1–0.9)
NEUTROPHILS # BLD AUTO: 5.86 10*3/MM3 (ref 1.7–7)
NEUTROPHILS NFR BLD MANUAL: 55.2 % (ref 42.7–76)
NEUTS VAC BLD QL SMEAR: ABNORMAL
NITRITE UR QL STRIP: NEGATIVE
NRBC BLD AUTO-RTO: 0 /100 WBC (ref 0–0.2)
OVALOCYTES BLD QL SMEAR: ABNORMAL
PH UR STRIP.AUTO: 6.5 [PH] (ref 5–8)
PLAT MORPH BLD: NORMAL
PLATELET # BLD AUTO: 389 10*3/MM3 (ref 140–450)
PMV BLD AUTO: 10.8 FL (ref 6–12)
POIKILOCYTOSIS BLD QL SMEAR: ABNORMAL
POLYCHROMASIA BLD QL SMEAR: ABNORMAL
POTASSIUM SERPL-SCNC: 3.7 MMOL/L (ref 3.5–5.2)
PROT SERPL-MCNC: 7.3 G/DL (ref 6–8.5)
PROT UR QL STRIP: NEGATIVE
RBC # BLD AUTO: 4.2 10*6/MM3 (ref 3.77–5.28)
RBC # UR STRIP: ABNORMAL /HPF
REF LAB TEST METHOD: ABNORMAL
SODIUM SERPL-SCNC: 145 MMOL/L (ref 136–145)
SP GR UR STRIP: >1.03 (ref 1–1.03)
SQUAMOUS #/AREA URNS HPF: ABNORMAL /HPF
UROBILINOGEN UR QL STRIP: ABNORMAL
VARIANT LYMPHS NFR BLD MANUAL: 1 % (ref 0–5)
VARIANT LYMPHS NFR BLD MANUAL: 36.5 % (ref 19.6–45.3)
WBC # UR STRIP: ABNORMAL /HPF
WBC NRBC COR # BLD: 10.62 10*3/MM3 (ref 3.4–10.8)
WHOLE BLOOD HOLD COAG: NORMAL
WHOLE BLOOD HOLD SPECIMEN: NORMAL

## 2023-08-13 PROCEDURE — 96375 TX/PRO/DX INJ NEW DRUG ADDON: CPT

## 2023-08-13 PROCEDURE — 99283 EMERGENCY DEPT VISIT LOW MDM: CPT

## 2023-08-13 PROCEDURE — 80053 COMPREHEN METABOLIC PANEL: CPT | Performed by: STUDENT IN AN ORGANIZED HEALTH CARE EDUCATION/TRAINING PROGRAM

## 2023-08-13 PROCEDURE — 84703 CHORIONIC GONADOTROPIN ASSAY: CPT | Performed by: STUDENT IN AN ORGANIZED HEALTH CARE EDUCATION/TRAINING PROGRAM

## 2023-08-13 PROCEDURE — 36415 COLL VENOUS BLD VENIPUNCTURE: CPT

## 2023-08-13 PROCEDURE — 25010000002 HYDROMORPHONE PER 4 MG: Performed by: STUDENT IN AN ORGANIZED HEALTH CARE EDUCATION/TRAINING PROGRAM

## 2023-08-13 PROCEDURE — 85007 BL SMEAR W/DIFF WBC COUNT: CPT | Performed by: STUDENT IN AN ORGANIZED HEALTH CARE EDUCATION/TRAINING PROGRAM

## 2023-08-13 PROCEDURE — 83690 ASSAY OF LIPASE: CPT | Performed by: STUDENT IN AN ORGANIZED HEALTH CARE EDUCATION/TRAINING PROGRAM

## 2023-08-13 PROCEDURE — 25510000001 IOPAMIDOL 61 % SOLUTION: Performed by: STUDENT IN AN ORGANIZED HEALTH CARE EDUCATION/TRAINING PROGRAM

## 2023-08-13 PROCEDURE — 99285 EMERGENCY DEPT VISIT HI MDM: CPT

## 2023-08-13 PROCEDURE — 85025 COMPLETE CBC W/AUTO DIFF WBC: CPT | Performed by: STUDENT IN AN ORGANIZED HEALTH CARE EDUCATION/TRAINING PROGRAM

## 2023-08-13 PROCEDURE — 74177 CT ABD & PELVIS W/CONTRAST: CPT

## 2023-08-13 PROCEDURE — 25010000002 ONDANSETRON PER 1 MG: Performed by: STUDENT IN AN ORGANIZED HEALTH CARE EDUCATION/TRAINING PROGRAM

## 2023-08-13 PROCEDURE — 83605 ASSAY OF LACTIC ACID: CPT | Performed by: EMERGENCY MEDICINE

## 2023-08-13 PROCEDURE — 81001 URINALYSIS AUTO W/SCOPE: CPT | Performed by: STUDENT IN AN ORGANIZED HEALTH CARE EDUCATION/TRAINING PROGRAM

## 2023-08-13 PROCEDURE — 96374 THER/PROPH/DIAG INJ IV PUSH: CPT

## 2023-08-13 PROCEDURE — 84703 CHORIONIC GONADOTROPIN ASSAY: CPT | Performed by: EMERGENCY MEDICINE

## 2023-08-13 RX ORDER — HYDROMORPHONE HYDROCHLORIDE 1 MG/ML
0.5 INJECTION, SOLUTION INTRAMUSCULAR; INTRAVENOUS; SUBCUTANEOUS ONCE
Status: COMPLETED | OUTPATIENT
Start: 2023-08-13 | End: 2023-08-13

## 2023-08-13 RX ORDER — SODIUM CHLORIDE 0.9 % (FLUSH) 0.9 %
10 SYRINGE (ML) INJECTION AS NEEDED
Status: DISCONTINUED | OUTPATIENT
Start: 2023-08-13 | End: 2023-08-13 | Stop reason: HOSPADM

## 2023-08-13 RX ORDER — DROPERIDOL 2.5 MG/ML
1.25 INJECTION, SOLUTION INTRAMUSCULAR; INTRAVENOUS ONCE
Status: DISCONTINUED | OUTPATIENT
Start: 2023-08-13 | End: 2023-08-13 | Stop reason: HOSPADM

## 2023-08-13 RX ORDER — ONDANSETRON 2 MG/ML
4 INJECTION INTRAMUSCULAR; INTRAVENOUS ONCE
Status: COMPLETED | OUTPATIENT
Start: 2023-08-13 | End: 2023-08-13

## 2023-08-13 RX ORDER — ONDANSETRON 4 MG/1
4 TABLET, ORALLY DISINTEGRATING ORAL EVERY 6 HOURS PRN
Qty: 12 TABLET | Refills: 0 | Status: SHIPPED | OUTPATIENT
Start: 2023-08-13 | End: 2023-08-16

## 2023-08-13 RX ADMIN — HYDROMORPHONE HYDROCHLORIDE 0.5 MG: 1 INJECTION, SOLUTION INTRAMUSCULAR; INTRAVENOUS; SUBCUTANEOUS at 01:23

## 2023-08-13 RX ADMIN — ONDANSETRON 4 MG: 2 INJECTION INTRAMUSCULAR; INTRAVENOUS at 01:24

## 2023-08-13 RX ADMIN — IOPAMIDOL 100 ML: 612 INJECTION, SOLUTION INTRAVENOUS at 02:14

## 2023-08-13 NOTE — ED PROVIDER NOTES
Subjective   History of Present Illness  Patient is a 18-year-old lady he was just seen in the ED with abdominal pain and diarrhea negative CTs negative lab work-up and was discharged home she is back in the ED crying that she is having persistent diarrhea and vomiting and not be able to keep anything.    Diarrhea  The primary symptoms include nausea, vomiting and diarrhea. Primary symptoms do not include weight loss, fatigue, jaundice, hematochezia, myalgias or arthralgias.   The illness does not include chills, anorexia, bloating or constipation. Associated medical issues do not include inflammatory bowel disease or liver disease.     Review of Systems   Constitutional: Negative.  Negative for chills, fatigue and weight loss.   HENT: Negative.     Eyes: Negative.    Respiratory: Negative.     Cardiovascular: Negative.    Gastrointestinal:  Positive for diarrhea, nausea and vomiting. Negative for anorexia, bloating, constipation, hematochezia and jaundice.   Endocrine: Negative.    Genitourinary: Negative.    Musculoskeletal:  Negative for arthralgias and myalgias.   Skin: Negative.    Neurological: Negative.    Hematological: Negative.    All other systems reviewed and are negative.    Past Medical History:   Diagnosis Date    Allergic     Allergic rhinitis     Asthma     Attention deficit disorder     Bipolar 1 disorder     Chlamydia     Depression     Fetal alcohol spectrum disorder     Fetal alcohol syndrome     Gestational diabetes     Herpes     Insomnia     Nocturnal enuresis     Seizure        Allergies   Allergen Reactions    Penicillins Anaphylaxis    Shellfish-Derived Products Anaphylaxis    Cow's Milk [Milk (Cow)] Unknown (See Comments)     + on previous allergy testing.     Peanut-Containing Drug Products Unknown (See Comments)     positive on past allergy testing (paper scanned)    Shellfish-Derived Products Unknown (See Comments)     + on past allergy testing per Foster mother.     Penicillins  Unknown (See Comments)     Positive on allergy testing       Past Surgical History:   Procedure Laterality Date     SECTION N/A 7/3/2022    Procedure:  SECTION PRIMARY;  Surgeon: Jolene Bellamy DO;  Location: [facility] LABOR DELIVERY;  Service: Obstetrics;  Laterality: N/A;     SHUNT INSERTION       SHUNT REMOVAL         Family History   Adopted: Yes   Family history unknown: Yes       Social History     Socioeconomic History    Marital status: Single   Tobacco Use    Smoking status: Every Day     Packs/day: 0.25     Years: 5.00     Pack years: 1.25     Types: Cigarettes    Smokeless tobacco: Never   Vaping Use    Vaping Use: Never used   Substance and Sexual Activity    Alcohol use: No    Drug use: Yes     Frequency: 1.0 times per week     Types: Marijuana    Sexual activity: Yes           Objective   Physical Exam  Vitals and nursing note reviewed. Exam conducted with a chaperone present.   Constitutional:       General: She is awake. She is not in acute distress.     Appearance: She is well-developed and underweight. She is not toxic-appearing.      Comments: Persistently crying difficult to examine   HENT:      Head: Normocephalic and atraumatic.      Nose:      Right Nostril: No epistaxis.      Left Nostril: No epistaxis.   Eyes:      General: Lids are normal. No scleral icterus.     Conjunctiva/sclera: Conjunctivae normal.      Pupils: Pupils are equal, round, and reactive to light.   Neck:      Vascular: No hepatojugular reflux or JVD.   Cardiovascular:      Rate and Rhythm: Normal rate and regular rhythm.      Chest Wall: PMI is not displaced.      Pulses: Normal pulses. No decreased pulses.      Heart sounds: Normal heart sounds. No murmur heard.  Pulmonary:      Effort: Pulmonary effort is normal. No accessory muscle usage or respiratory distress.      Breath sounds: Normal breath sounds. No decreased breath sounds or wheezing.   Abdominal:      General: Abdomen is flat. Bowel  sounds are normal. There is no distension or abdominal bruit.      Palpations: Abdomen is soft. There is no shifting dullness, fluid wave, mass or pulsatile mass.      Tenderness: There is generalized no abdominal tenderness. There is no right CVA tenderness, left CVA tenderness, guarding or rebound.      Hernia: No hernia is present.      Comments: No guarding or rebound tenderness.   Musculoskeletal:         General: Normal range of motion.      Cervical back: Normal range of motion and neck supple. No rigidity.      Right lower leg: No edema.      Left lower leg: No edema.   Skin:     General: Skin is warm and dry.      Capillary Refill: Capillary refill takes less than 2 seconds.      Coloration: Skin is not cyanotic, jaundiced, mottled or pale.   Neurological:      General: No focal deficit present.      Mental Status: She is alert and oriented to person, place, and time. Mental status is at baseline.      GCS: GCS eye subscore is 4. GCS verbal subscore is 5. GCS motor subscore is 6.      Cranial Nerves: No cranial nerve deficit.      Sensory: Sensation is intact.      Motor: Motor function is intact.      Deep Tendon Reflexes: Reflexes are normal and symmetric.   Psychiatric:         Behavior: Behavior normal. Behavior is uncooperative.       Procedures           ED Course  ED Course as of 08/13/23 1420   Sun Aug 13, 2023   0815 1. Mild fatty infiltration of the liver adjacent to the falciform  ligament. A stable small somewhat linear cyst is also noted in the left  hepatic lobe.  2. Splenomegaly. Size measurements listed above.  3. Left-sided inferior vena cava that continues superiorly on the left  side into the azygos vein. The suprarenal inferior vena cava is small in  caliber. This is a normal variant.  4. A 3.4 cm right ovarian cyst. Small amount of free fluid in the pelvis  is likely physiologic.  5. Distended and fluid-filled stomach. Small bowel loops are nondilated.  There are some fluid filled small  bowel loops. Enteritis is considered.  The appendix is normal. There is moderate stool in the colon.   [TS]   1102 Patient's work-up was reviewed I have ordered gastrointestinal panel etc. on her nursing staff telling me that she did not want to stay in the hospital one of my midlevel been to talk to her.  The patient refused to stay in the hospital and wants to go home.  Not been able to get some lab work-up or any Panels on Her.  Her Lactic Acid Is Negative and She Was Nonpregnant. [TS]   1103 Patient has been advised to return the ER if she change her mind or new worsening symptoms but to encourage p.o. fluids. [TS]   1104 The patient has requested to leave the ED and does not want to be admitted nor does he want any further work-up at this time. The patient reason(s) for leaving include, but are not limited to, the following: Feels better and wants to go home. I believe this patient is of sound mind and competent to refuse medical care. The patient is responding and asking questions appropriately. The patient is oriented to person, place and time. The patient is not psychotic, delusional, suicidal, homicidal or hallucinating. The patient demonstrates a normal mental capacity to make decisions regarding their healthcare. The patient is clinically sober and does not appear to be under the influence of any illicit drugs at this time. The patient has been advised of the risks, in layman terms, of leaving  which include, but are not limited to death, coma, permanent disability, loss of current lifestyle, delay in diagnosis. Alternatives have been offered - the patient remains steadfast in their wish to leave. The patient has been advised that should they change their mind they are welcome to return to this hospital, or any other,  I have provided appropriate prescriptions, referrals, and discharge instructions.. The above discussion was witnessed by another member of staff.  Patient although is argumentative and  "inappropriate in her interaction with the medical staff [TS]   1104 Went to speak with the patient who was sleeping. She was inappropriate and cussing stating, \"I just want to sleep, leave me the fuck alone.\" As she was waking up this examiner turned the light on for better evaluation and she states, \"I'm just going to keep shitting on myself and I want to eat so I'm going to leave this fucking place. I'm calling my fucking brother so I can go eat.\" Offered to continue with the work up including labs and iv fluids and patient is refusing work up saying she wants to leave AMA. Patient was instructed about appropriate behavior while in the emergency dept [TW]      ED Course User Index  [TS] Ellis Lux MD  [TW] Tamiko Calvo APRN                                           Medical Decision Making  Problems Addressed:  Diarrhea, unspecified type: acute illness or injury    Risk  Prescription drug management.        Final diagnoses:   Diarrhea, unspecified type       ED Disposition  ED Disposition       ED Disposition   AMA    Condition   --    Comment   --               No follow-up provider specified.       Medication List      No changes were made to your prescriptions during this visit.            Ellis Lux MD  08/13/23 1105       Ellis Lux MD  08/13/23 1420    "

## 2023-08-13 NOTE — DISCHARGE INSTRUCTIONS
It was very nice to meet you, Dani. Thank you for allowing us to take care of you today at [facility].    Your evaluation today did not show any emergent findings or have any emergent indications for admission to the hospital.  Please follow-up with your OB/GYN regarding your ovarian cyst.  Please use Zofran for nausea.  Please use Tylenol or ibuprofen for any further pain.  Please use a bland diet and plenty of fluids over the next few days to improve your symptoms.    Please understand that an ER evaluation is just the start of your evaluation. We will do what we can, but we are often unable to fully figure out what is causing your symptoms from one evaluation. Thus, our primary goal is to determine whether you need to be evaluated in the hospital or if it is safe for you to go home and see other doctors such as a primary care physician or a specialist on an outpatient basis.     Like we discussed, it is VERY IMPORTANT that you follow up with your primary care doctor (call them to set up an appointment) within the next few days or as soon as possible so that you can be re-evaluated for improvement in your symptoms or for any other questions.     A copy of your results should be included in your paperwork. If you were prescribed any medications, please take them as directed or call us back with any questions.    Please return to the emergency room within 12-48 hours if you experience fever, chills, chest pain or shortness of breath, pain with inspiration/expiration, pain that travels to your arms, neck or back, nausea, vomiting, severe headache, tearing pain in your chest, dizziness, feel as though you are about to pass out, have any worsening symptoms, or any other concerns.

## 2023-08-13 NOTE — ED NOTES
Pt instructed on leaving AMA.  PT informed that we are offering to improve condition, prevent deterioration, and preserve life.  Pt refuses all care and desires to leave.  Pt refused to sign to AMA form.

## 2023-08-15 NOTE — ED PROVIDER NOTES
Subjective   History of Present Illness  Patient states that she has been having abdominal pain for the past few hours.  States that she is also having some nausea and throughout the whole pizza on arrival to the ER.  States that she currently feels a lot better after throwing up.  Denies any chest pain or shortness of breath.  Denies any chances that she is pregnant.  Denies any hematuria hematochezia.  Denies any lower extremity weakness.  States that the pain does not come and go and was just prior to arrival and currently is not hurting.     Review of Systems   All other systems reviewed and are negative.    Past Medical History:   Diagnosis Date    Allergic     Allergic rhinitis     Asthma     Attention deficit disorder     Bipolar 1 disorder     Chlamydia     Depression     Fetal alcohol spectrum disorder     Fetal alcohol syndrome     Gestational diabetes     Herpes     Insomnia     Nocturnal enuresis     Seizure        Allergies   Allergen Reactions    Penicillins Anaphylaxis    Shellfish-Derived Products Anaphylaxis    Cow's Milk [Milk (Cow)] Unknown (See Comments)     + on previous allergy testing.     Peanut-Containing Drug Products Unknown (See Comments)     positive on past allergy testing (paper scanned)    Shellfish-Derived Products Unknown (See Comments)     + on past allergy testing per Foster mother.     Penicillins Unknown (See Comments)     Positive on allergy testing       Past Surgical History:   Procedure Laterality Date     SECTION N/A 7/3/2022    Procedure:  SECTION PRIMARY;  Surgeon: Jolene Bellamy DO;  Location: [facility] LABOR DELIVERY;  Service: Obstetrics;  Laterality: N/A;     SHUNT INSERTION       SHUNT REMOVAL         Family History   Adopted: Yes   Family history unknown: Yes       Social History     Socioeconomic History    Marital status: Single   Tobacco Use    Smoking status: Every Day     Packs/day: 0.25     Years: 5.00     Pack years: 1.25     Types:  Cigarettes    Smokeless tobacco: Never   Vaping Use    Vaping Use: Never used   Substance and Sexual Activity    Alcohol use: No    Drug use: Yes     Frequency: 1.0 times per week     Types: Marijuana    Sexual activity: Yes           Objective   Physical Exam  Vitals and nursing note reviewed.   Constitutional:       General: She is not in acute distress.     Appearance: Normal appearance. She is not toxic-appearing or diaphoretic.   HENT:      Head: Normocephalic and atraumatic.      Right Ear: External ear normal.      Left Ear: External ear normal.      Nose: Nose normal.      Mouth/Throat:      Mouth: Mucous membranes are moist.   Eyes:      General:         Right eye: No discharge.         Left eye: No discharge.      Extraocular Movements: Extraocular movements intact.      Conjunctiva/sclera: Conjunctivae normal.   Cardiovascular:      Rate and Rhythm: Normal rate and regular rhythm.      Pulses: Normal pulses.   Pulmonary:      Effort: Pulmonary effort is normal. No respiratory distress.      Breath sounds: No rhonchi.   Abdominal:      General: Abdomen is flat.      Tenderness: There is abdominal tenderness (mild, RLQ and LLQ). There is no guarding or rebound.   Musculoskeletal:         General: No deformity or signs of injury.   Skin:     General: Skin is warm.      Coloration: Skin is not jaundiced.   Neurological:      Mental Status: She is alert and oriented to person, place, and time. Mental status is at baseline.   Psychiatric:         Mood and Affect: Mood normal.         Behavior: Behavior normal.         Thought Content: Thought content normal.         Judgment: Judgment normal.       Procedures           ED Course  ED Course as of 08/15/23 1722   Sun Aug 13, 2023   0528 IMPRESSION:  1. Mild fatty infiltration of the liver adjacent to the falciform  ligament. A stable small somewhat linear cyst is also noted in the left  hepatic lobe.  2. Splenomegaly. Size measurements listed above.  3.  Left-sided inferior vena cava that continues superiorly on the left  side into the azygos vein. The suprarenal inferior vena cava is small in  caliber. This is a normal variant.  4. A 3.4 cm right ovarian cyst. Small amount of free fluid in the pelvis  is likely physiologic.  5. Distended and fluid-filled stomach. Small bowel loops are nondilated.  There are some fluid filled small bowel loops. Enteritis is considered.  The appendix is normal. There is moderate stool in the colon.      [NP]      ED Course User Index  [NP] Sony Pedraza MD                                           Medical Decision Making  Dani Trinidad is a very pleasant 18 y.o. female who presents to the ED for abdominal pain and vomiting.     Patient was non-toxic and not-ill appearing on arrival.     Vital signs stable on arrival.     Patient's presentation raises suspicion for differentials including, but not limited to, diverticulitis, appendicitis, cholecystitis.     External (non-ED) record review: none    Given this, Dani was placed on the monitor. Laboratory studies and imaging studies were ordered including cbc, cmp, lipase, ua, cT abdomen/pelvis.     Dani was given fluids and pain medication and nausea medication.    Imaging was personally reviewed and interpreted to be notable for no acute surgical abnormality.    Labs were reviewed and pertinent for no acute emergently admittable abnormalities.     Decision rules/scores evaluated: none     On re-evaluation, patient remained hemodynamically stable and appeared to be comfortable and in no acute distress.    Given findings described above, patient's presentation is most likely related to possible gastroenteritis.     At this point, after reviewing the workup, I have a low suspicion for appendicitis or ovarian torsion given reassuring exam and lack of intermittent pain and lack of vaginal bleeding.    I had a thorough discussion regarding the workup, results so far, and my impression  so far. I said that she should come back if she has any new symptoms. I said that based on the current workup, there is not an unstable medical condition requiring admission to the hospital.     I spent an appropriate amount of time necessary at the bedside preceding discharge so I could explain aftercare instructions, provide more patient eduction, give explanations of my interpretation of the evaluation, and to ensure that everyone understood the plan of care. I also discussed the fact that even after being discharged there could be an acute emergent condition that arises requiring further evaluation, admission, or even surgical intervention.     However, I said that it is VERY IMPORTANT that Dani follows up, by CALLING as soon as possible to set up an appointment, with the primary care doctor within the next few days or as soon as reasonably possible so that the symptoms can be re-evaluated for improvement or for any other questions.     I also gave Dani common sense return precautions and encouraged a quick return to the emergency department within 24 - 48hrs if there are ANY concerns, worsening of condition, new complaints, or if unable to seek follow-up in a timely fashion.     The patient verbalized understanding of the discharge instructions and agreed with them.     Dani was discharged in stable condition.        Signed by:   Sony Pedraza MD 8/15/2023 17:21 CDT   Emergency Medicine Physician    Dragon disclaimer:  Part of this note may be an electronic transcription/translation of spoken language to printed text using the Dragon Dictation System.         Problems Addressed:  Abdominal pain, unspecified abdominal location: complicated acute illness or injury  Cyst of right ovary: complicated acute illness or injury  Gastroenteritis: complicated acute illness or injury    Amount and/or Complexity of Data Reviewed  Labs: ordered.  Radiology: ordered.    Risk  Prescription drug management.        Final  diagnoses:   Gastroenteritis   Abdominal pain, unspecified abdominal location   Cyst of right ovary       ED Disposition  ED Disposition       ED Disposition   Discharge    Condition   Stable    Comment   --               Provider, No Known  [facility]  [place] [address]  [phone]    Call in 1 day  As needed, If symptoms worsen         Medication List        New Prescriptions      ondansetron ODT 4 MG disintegrating tablet  Commonly known as: ZOFRAN-ODT  Place 1 tablet on the tongue Every 6 (Six) Hours As Needed for Nausea or Vomiting for up to 3 days.               Where to Get Your Medications        These medications were sent to [facility]/pharmacy #[id number] - [place], [place] - [address] AT [address]OSS FROM [address] - [phone]  - [phone]   [address] [facility] [place] [address]      Phone: [phone]   ondansetron ODT 4 MG disintegrating tablet            Sony Pedraza MD  08/15/23 4021

## 2024-08-19 NOTE — CASE MANAGEMENT/SOCIAL WORK
SW consulted due to high PPD score and prenatal THC use. SW has spoken with mother, she states that she has no current concerns regarding PPD and will seek assistance from her PCP if any symptoms arise. [place] in [place] in [place]. Mother states that she has a supportive family and will have help at home. Mother states that she has all needed items to care for baby at home upon dc. Mother denies any needs or concerns at this time. Mother and baby to dc home when medically ready.    PAIN

## 2024-09-29 ENCOUNTER — HOSPITAL ENCOUNTER (INPATIENT)
Facility: HOSPITAL | Age: 19
LOS: 3 days | Discharge: HOME | End: 2024-10-03
Attending: OBSTETRICS & GYNECOLOGY | Admitting: OBSTETRICS & GYNECOLOGY
Payer: MEDICAID

## 2024-09-29 ENCOUNTER — HOSPITAL ENCOUNTER (EMERGENCY)
Facility: HOSPITAL | Age: 19
Discharge: HOME | DRG: 776 | End: 2024-09-29
Admitting: EMERGENCY MEDICINE
Payer: MEDICAID

## 2024-09-29 ENCOUNTER — APPOINTMENT (OUTPATIENT)
Dept: ULTRASOUND IMAGING | Facility: HOSPITAL | Age: 19
DRG: 776 | End: 2024-09-29
Payer: MEDICAID

## 2024-09-29 VITALS
BODY MASS INDEX: 29.25 KG/M2 | TEMPERATURE: 98 F | SYSTOLIC BLOOD PRESSURE: 146 MMHG | HEIGHT: 66 IN | OXYGEN SATURATION: 99 % | DIASTOLIC BLOOD PRESSURE: 90 MMHG | HEART RATE: 122 BPM | RESPIRATION RATE: 20 BRPM | WEIGHT: 182 LBS

## 2024-09-29 DIAGNOSIS — O46.90 VAGINAL BLEEDING DURING PREGNANCY: ICD-10-CM

## 2024-09-29 DIAGNOSIS — O23.42 URINARY TRACT INFECTION IN MOTHER DURING SECOND TRIMESTER OF PREGNANCY: ICD-10-CM

## 2024-09-29 DIAGNOSIS — R10.9 ABDOMINAL CRAMPING AFFECTING PREGNANCY: ICD-10-CM

## 2024-09-29 DIAGNOSIS — O26.899 ABDOMINAL CRAMPING AFFECTING PREGNANCY: ICD-10-CM

## 2024-09-29 DIAGNOSIS — O47.02 THREATENED PREMATURE LABOR IN SECOND TRIMESTER: Primary | ICD-10-CM

## 2024-09-29 DIAGNOSIS — A59.9 TRICHOMONIASIS: ICD-10-CM

## 2024-09-29 DIAGNOSIS — O71.02: Primary | ICD-10-CM

## 2024-09-29 PROBLEM — O46.93 VAGINAL BLEEDING IN PREGNANCY, THIRD TRIMESTER: Status: ACTIVE | Noted: 2024-09-29

## 2024-09-29 PROBLEM — O09.33 NO PRENATAL CARE IN CURRENT PREGNANCY IN THIRD TRIMESTER: Status: ACTIVE | Noted: 2024-09-29

## 2024-09-29 PROBLEM — O23.593 TRICHOMONAL VAGINITIS DURING PREGNANCY IN THIRD TRIMESTER: Status: ACTIVE | Noted: 2024-09-29

## 2024-09-29 PROBLEM — O34.219 PREGNANCY WITH HISTORY OF CESAREAN SECTION, ANTEPARTUM: Status: ACTIVE | Noted: 2024-09-29

## 2024-09-29 PROBLEM — Z3A.29 29 WEEKS GESTATION OF PREGNANCY: Status: ACTIVE | Noted: 2024-09-29

## 2024-09-29 PROBLEM — O71.03: Status: ACTIVE | Noted: 2024-09-29

## 2024-09-29 PROBLEM — O23.40 UTI (URINARY TRACT INFECTION) DURING PREGNANCY: Status: ACTIVE | Noted: 2024-09-29

## 2024-09-29 PROBLEM — O9A.413: Status: ACTIVE | Noted: 2024-09-29

## 2024-09-29 PROBLEM — A59.01 TRICHOMONAL VAGINITIS DURING PREGNANCY IN THIRD TRIMESTER: Status: ACTIVE | Noted: 2024-09-29

## 2024-09-29 LAB
ABO GROUP BLD: NORMAL
ALBUMIN SERPL-MCNC: 3.7 G/DL (ref 3.5–5.2)
ALBUMIN/GLOB SERPL: 1 G/DL
ALP SERPL-CCNC: 116 U/L (ref 39–117)
ALT SERPL W P-5'-P-CCNC: 9 U/L (ref 1–33)
AMPHET+METHAMPHET UR QL: NEGATIVE
AMPHETAMINES UR QL: NEGATIVE
ANION GAP SERPL CALCULATED.3IONS-SCNC: 10 MMOL/L (ref 5–15)
AST SERPL-CCNC: 15 U/L (ref 1–32)
BACTERIA UR QL AUTO: ABNORMAL /HPF
BARBITURATES UR QL SCN: NEGATIVE
BASOPHILS # BLD AUTO: 0.04 10*3/MM3 (ref 0–0.2)
BASOPHILS NFR BLD AUTO: 0.3 % (ref 0–1.5)
BENZODIAZ UR QL SCN: NEGATIVE
BILIRUB SERPL-MCNC: <0.2 MG/DL (ref 0–1.2)
BILIRUB UR QL STRIP: NEGATIVE
BLD GP AB SCN SERPL QL: NEGATIVE
BUN SERPL-MCNC: 9 MG/DL (ref 6–20)
BUN/CREAT SERPL: 23.1 (ref 7–25)
BUPRENORPHINE SERPL-MCNC: NEGATIVE NG/ML
CALCIUM SPEC-SCNC: 9 MG/DL (ref 8.6–10.5)
CANNABINOIDS SERPL QL: POSITIVE
CHLORIDE SERPL-SCNC: 104 MMOL/L (ref 98–107)
CLARITY UR: ABNORMAL
CO2 SERPL-SCNC: 25 MMOL/L (ref 22–29)
COCAINE UR QL: NEGATIVE
COLOR UR: YELLOW
CREAT SERPL-MCNC: 0.39 MG/DL (ref 0.57–1)
DEPRECATED RDW RBC AUTO: 41.2 FL (ref 37–54)
EGFRCR SERPLBLD CKD-EPI 2021: 145.5 ML/MIN/1.73
EOSINOPHIL # BLD AUTO: 0.26 10*3/MM3 (ref 0–0.4)
EOSINOPHIL NFR BLD AUTO: 1.9 % (ref 0.3–6.2)
ERYTHROCYTE [DISTWIDTH] IN BLOOD BY AUTOMATED COUNT: 13.3 % (ref 12.3–15.4)
FENTANYL UR-MCNC: NEGATIVE NG/ML
GLOBULIN UR ELPH-MCNC: 3.7 GM/DL
GLUCOSE SERPL-MCNC: 60 MG/DL (ref 65–99)
GLUCOSE UR STRIP-MCNC: NEGATIVE MG/DL
HBA1C MFR BLD: 5.2 % (ref 4.8–5.6)
HCG INTACT+B SERPL-ACNC: NORMAL MIU/ML
HCG SERPL QL: POSITIVE
HCT VFR BLD AUTO: 31 % (ref 34–46.6)
HGB BLD-MCNC: 9.9 G/DL (ref 12–15.9)
HGB UR QL STRIP.AUTO: ABNORMAL
HYALINE CASTS UR QL AUTO: ABNORMAL /LPF
IMM GRANULOCYTES # BLD AUTO: 0.1 10*3/MM3 (ref 0–0.05)
IMM GRANULOCYTES NFR BLD AUTO: 0.7 % (ref 0–0.5)
KETONES UR QL STRIP: NEGATIVE
LEUKOCYTE ESTERASE UR QL STRIP.AUTO: ABNORMAL
LYMPHOCYTES # BLD AUTO: 2.85 10*3/MM3 (ref 0.7–3.1)
LYMPHOCYTES NFR BLD AUTO: 20.7 % (ref 19.6–45.3)
MCH RBC QN AUTO: 27 PG (ref 26.6–33)
MCHC RBC AUTO-ENTMCNC: 31.9 G/DL (ref 31.5–35.7)
MCV RBC AUTO: 84.5 FL (ref 79–97)
METHADONE UR QL SCN: NEGATIVE
MONOCYTES # BLD AUTO: 1.03 10*3/MM3 (ref 0.1–0.9)
MONOCYTES NFR BLD AUTO: 7.5 % (ref 5–12)
MUCOUS THREADS URNS QL MICRO: ABNORMAL /HPF
NEUTROPHILS NFR BLD AUTO: 68.9 % (ref 42.7–76)
NEUTROPHILS NFR BLD AUTO: 9.46 10*3/MM3 (ref 1.7–7)
NITRITE UR QL STRIP: NEGATIVE
NRBC BLD AUTO-RTO: 0 /100 WBC (ref 0–0.2)
NUMBER OF DOSES: NORMAL
OPIATES UR QL: NEGATIVE
OXYCODONE UR QL SCN: NEGATIVE
PCP UR QL SCN: NEGATIVE
PH UR STRIP.AUTO: 6.5 [PH] (ref 5–8)
PLATELET # BLD AUTO: 294 10*3/MM3 (ref 140–450)
PMV BLD AUTO: 11.5 FL (ref 6–12)
POTASSIUM SERPL-SCNC: 3.5 MMOL/L (ref 3.5–5.2)
PROT SERPL-MCNC: 7.4 G/DL (ref 6–8.5)
PROT UR QL STRIP: ABNORMAL
RBC # BLD AUTO: 3.67 10*6/MM3 (ref 3.77–5.28)
RBC # UR STRIP: ABNORMAL /HPF
REF LAB TEST METHOD: ABNORMAL
RH BLD: POSITIVE
SODIUM SERPL-SCNC: 139 MMOL/L (ref 136–145)
SP GR UR STRIP: 1.02 (ref 1–1.03)
SQUAMOUS #/AREA URNS HPF: ABNORMAL /HPF
TRICHOMONAS #/AREA URNS HPF: ABNORMAL /HPF
TRICYCLICS UR QL SCN: NEGATIVE
UROBILINOGEN UR QL STRIP: ABNORMAL
WBC # UR STRIP: ABNORMAL /HPF
WBC NRBC COR # BLD AUTO: 13.74 10*3/MM3 (ref 3.4–10.8)

## 2024-09-29 PROCEDURE — 83036 HEMOGLOBIN GLYCOSYLATED A1C: CPT | Performed by: OBSTETRICS & GYNECOLOGY

## 2024-09-29 PROCEDURE — 81001 URINALYSIS AUTO W/SCOPE: CPT | Performed by: NURSE PRACTITIONER

## 2024-09-29 PROCEDURE — 86850 RBC ANTIBODY SCREEN: CPT | Performed by: NURSE PRACTITIONER

## 2024-09-29 PROCEDURE — 76810 OB US >/= 14 WKS ADDL FETUS: CPT

## 2024-09-29 PROCEDURE — 86900 BLOOD TYPING SEROLOGIC ABO: CPT | Performed by: NURSE PRACTITIONER

## 2024-09-29 PROCEDURE — 87491 CHLMYD TRACH DNA AMP PROBE: CPT | Performed by: OBSTETRICS & GYNECOLOGY

## 2024-09-29 PROCEDURE — 87591 N.GONORRHOEAE DNA AMP PROB: CPT | Performed by: OBSTETRICS & GYNECOLOGY

## 2024-09-29 PROCEDURE — 87086 URINE CULTURE/COLONY COUNT: CPT | Performed by: NURSE PRACTITIONER

## 2024-09-29 PROCEDURE — 99284 EMERGENCY DEPT VISIT MOD MDM: CPT

## 2024-09-29 PROCEDURE — 80307 DRUG TEST PRSMV CHEM ANLYZR: CPT | Performed by: NURSE PRACTITIONER

## 2024-09-29 PROCEDURE — 87661 TRICHOMONAS VAGINALIS AMPLIF: CPT | Performed by: OBSTETRICS & GYNECOLOGY

## 2024-09-29 PROCEDURE — 84703 CHORIONIC GONADOTROPIN ASSAY: CPT | Performed by: NURSE PRACTITIONER

## 2024-09-29 PROCEDURE — 85025 COMPLETE CBC W/AUTO DIFF WBC: CPT | Performed by: NURSE PRACTITIONER

## 2024-09-29 PROCEDURE — 84702 CHORIONIC GONADOTROPIN TEST: CPT | Performed by: EMERGENCY MEDICINE

## 2024-09-29 PROCEDURE — 80053 COMPREHEN METABOLIC PANEL: CPT | Performed by: NURSE PRACTITIONER

## 2024-09-29 PROCEDURE — 86901 BLOOD TYPING SEROLOGIC RH(D): CPT | Performed by: NURSE PRACTITIONER

## 2024-09-29 RX ORDER — NITROFURANTOIN 25; 75 MG/1; MG/1
100 CAPSULE ORAL ONCE
Status: COMPLETED | OUTPATIENT
Start: 2024-09-29 | End: 2024-09-29

## 2024-09-29 RX ORDER — SODIUM CHLORIDE 0.9 % (FLUSH) 0.9 %
10 SYRINGE (ML) INJECTION AS NEEDED
Status: DISCONTINUED | OUTPATIENT
Start: 2024-09-29 | End: 2024-09-29 | Stop reason: HOSPADM

## 2024-09-29 RX ADMIN — NITROFURANTOIN MONOHYDRATE/MACROCRYSTALLINE 100 MG: 25; 75 CAPSULE ORAL at 23:01

## 2024-09-30 PROBLEM — Z34.90 PREGNANCY: Status: ACTIVE | Noted: 2024-09-30

## 2024-09-30 LAB
BACTERIA SPEC AEROBE CULT: NORMAL
C TRACH RRNA CVX QL NAA+PROBE: DETECTED
EXTERNAL GROUP B STREP ANTIGEN: POSITIVE
HBV SURFACE AG SERPL QL IA: NORMAL
HCV AB SER QL: NORMAL
HIV 1+2 AB+HIV1 P24 AG SERPL QL IA: NORMAL
N GONORRHOEA RRNA SPEC QL NAA+PROBE: NOT DETECTED
TREPONEMA PALLIDUM IGG+IGM AB [PRESENCE] IN SERUM OR PLASMA BY IMMUNOASSAY: NORMAL
TRICHOMONAS VAGINALIS PCR: DETECTED

## 2024-09-30 PROCEDURE — G0378 HOSPITAL OBSERVATION PER HR: HCPCS

## 2024-09-30 PROCEDURE — 87186 SC STD MICRODIL/AGAR DIL: CPT | Performed by: OBSTETRICS & GYNECOLOGY

## 2024-09-30 PROCEDURE — 86003 ALLG SPEC IGE CRUDE XTRC EA: CPT | Performed by: OBSTETRICS & GYNECOLOGY

## 2024-09-30 PROCEDURE — 36415 COLL VENOUS BLD VENIPUNCTURE: CPT | Performed by: OBSTETRICS & GYNECOLOGY

## 2024-09-30 PROCEDURE — 63710000001 ONDANSETRON ODT 4 MG TABLET DISPERSIBLE: Performed by: OBSTETRICS & GYNECOLOGY

## 2024-09-30 PROCEDURE — 87340 HEPATITIS B SURFACE AG IA: CPT | Performed by: OBSTETRICS & GYNECOLOGY

## 2024-09-30 PROCEDURE — 87081 CULTURE SCREEN ONLY: CPT | Performed by: OBSTETRICS & GYNECOLOGY

## 2024-09-30 PROCEDURE — 25010000002 BETAMETHASONE SODIUM PHOSPHATE 12 MG/2ML SOLUTION: Performed by: OBSTETRICS & GYNECOLOGY

## 2024-09-30 PROCEDURE — G0463 HOSPITAL OUTPT CLINIC VISIT: HCPCS

## 2024-09-30 PROCEDURE — 86803 HEPATITIS C AB TEST: CPT | Performed by: OBSTETRICS & GYNECOLOGY

## 2024-09-30 PROCEDURE — 25010000002 METRONIDAZOLE 500 MG/100ML SOLUTION: Performed by: OBSTETRICS & GYNECOLOGY

## 2024-09-30 PROCEDURE — 25810000003 LACTATED RINGERS PER 1000 ML: Performed by: OBSTETRICS & GYNECOLOGY

## 2024-09-30 PROCEDURE — 86780 TREPONEMA PALLIDUM: CPT | Performed by: OBSTETRICS & GYNECOLOGY

## 2024-09-30 PROCEDURE — 25810000003 LACTATED RINGERS SOLUTION: Performed by: OBSTETRICS & GYNECOLOGY

## 2024-09-30 PROCEDURE — G0432 EIA HIV-1/HIV-2 SCREEN: HCPCS | Performed by: OBSTETRICS & GYNECOLOGY

## 2024-09-30 RX ORDER — METRONIDAZOLE 500 MG/100ML
500 INJECTION, SOLUTION INTRAVENOUS ONCE
Status: COMPLETED | OUTPATIENT
Start: 2024-09-30 | End: 2024-09-30

## 2024-09-30 RX ORDER — ACETAMINOPHEN 325 MG/1
650 TABLET ORAL EVERY 6 HOURS PRN
Status: DISCONTINUED | OUTPATIENT
Start: 2024-09-30 | End: 2024-10-03 | Stop reason: HOSPADM

## 2024-09-30 RX ORDER — SODIUM CHLORIDE 0.9 % (FLUSH) 0.9 %
10 SYRINGE (ML) INJECTION EVERY 12 HOURS SCHEDULED
Status: DISCONTINUED | OUTPATIENT
Start: 2024-09-30 | End: 2024-10-03 | Stop reason: HOSPADM

## 2024-09-30 RX ORDER — AZITHROMYCIN 250 MG/1
1000 TABLET, FILM COATED ORAL ONCE
Status: COMPLETED | OUTPATIENT
Start: 2024-09-30 | End: 2024-09-30

## 2024-09-30 RX ORDER — METRONIDAZOLE 500 MG/1
500 TABLET ORAL 2 TIMES DAILY
Qty: 14 TABLET | Refills: 0 | Status: ON HOLD | OUTPATIENT
Start: 2024-09-30 | End: 2024-10-03

## 2024-09-30 RX ORDER — ONDANSETRON 4 MG/1
4 TABLET, ORALLY DISINTEGRATING ORAL EVERY 6 HOURS PRN
Status: DISCONTINUED | OUTPATIENT
Start: 2024-09-30 | End: 2024-10-03 | Stop reason: HOSPADM

## 2024-09-30 RX ORDER — NITROFURANTOIN 25; 75 MG/1; MG/1
100 CAPSULE ORAL 2 TIMES DAILY
Qty: 6 CAPSULE | Refills: 0 | Status: ON HOLD | OUTPATIENT
Start: 2024-09-30 | End: 2024-10-03

## 2024-09-30 RX ORDER — BETAMETHASONE SOD PHOSPH-WATER 6 MG/ML
12 VIAL (ML) INJECTION DAILY
Status: DISCONTINUED | OUTPATIENT
Start: 2024-09-30 | End: 2024-10-01

## 2024-09-30 RX ORDER — NICOTINE 21 MG/24HR
1 PATCH, TRANSDERMAL 24 HOURS TRANSDERMAL
Status: DISCONTINUED | OUTPATIENT
Start: 2024-09-30 | End: 2024-10-03 | Stop reason: HOSPADM

## 2024-09-30 RX ORDER — RISPERIDONE 1 MG/ML
1 SOLUTION ORAL EVERY 12 HOURS SCHEDULED
Status: DISCONTINUED | OUTPATIENT
Start: 2024-09-30 | End: 2024-10-03 | Stop reason: HOSPADM

## 2024-09-30 RX ORDER — CEFDINIR 300 MG/1
300 CAPSULE ORAL ONCE
Status: COMPLETED | OUTPATIENT
Start: 2024-09-30 | End: 2024-09-30

## 2024-09-30 RX ORDER — METRONIDAZOLE 500 MG/1
500 TABLET ORAL EVERY 8 HOURS SCHEDULED
Status: DISCONTINUED | OUTPATIENT
Start: 2024-09-30 | End: 2024-10-03

## 2024-09-30 RX ORDER — SODIUM CHLORIDE 0.9 % (FLUSH) 0.9 %
10 SYRINGE (ML) INJECTION AS NEEDED
Status: DISCONTINUED | OUTPATIENT
Start: 2024-09-30 | End: 2024-10-03 | Stop reason: HOSPADM

## 2024-09-30 RX ORDER — CALCIUM CARBONATE 500 MG/1
2 TABLET, CHEWABLE ORAL 3 TIMES DAILY PRN
Status: DISCONTINUED | OUTPATIENT
Start: 2024-09-30 | End: 2024-10-03 | Stop reason: HOSPADM

## 2024-09-30 RX ORDER — SODIUM CHLORIDE, SODIUM LACTATE, POTASSIUM CHLORIDE, CALCIUM CHLORIDE 600; 310; 30; 20 MG/100ML; MG/100ML; MG/100ML; MG/100ML
50 INJECTION, SOLUTION INTRAVENOUS CONTINUOUS
Status: DISCONTINUED | OUTPATIENT
Start: 2024-09-30 | End: 2024-10-03 | Stop reason: HOSPADM

## 2024-09-30 RX ADMIN — Medication 12 MG: at 16:13

## 2024-09-30 RX ADMIN — CEFDINIR 300 MG: 300 CAPSULE ORAL at 12:58

## 2024-09-30 RX ADMIN — AZITHROMYCIN 1000 MG: 250 TABLET, FILM COATED ORAL at 16:14

## 2024-09-30 RX ADMIN — SODIUM CHLORIDE, POTASSIUM CHLORIDE, SODIUM LACTATE AND CALCIUM CHLORIDE 125 ML/HR: 600; 310; 30; 20 INJECTION, SOLUTION INTRAVENOUS at 08:53

## 2024-09-30 RX ADMIN — ANTACID TABLETS 2 TABLET: 500 TABLET, CHEWABLE ORAL at 01:33

## 2024-09-30 RX ADMIN — SODIUM CHLORIDE, POTASSIUM CHLORIDE, SODIUM LACTATE AND CALCIUM CHLORIDE 1000 ML: 600; 310; 30; 20 INJECTION, SOLUTION INTRAVENOUS at 07:55

## 2024-09-30 RX ADMIN — METRONIDAZOLE 500 MG: 500 INJECTION, SOLUTION INTRAVENOUS at 10:38

## 2024-09-30 RX ADMIN — ONDANSETRON 4 MG: 4 TABLET, ORALLY DISINTEGRATING ORAL at 01:33

## 2024-09-30 RX ADMIN — METRONIDAZOLE 500 MG: 500 TABLET ORAL at 16:14

## 2024-09-30 RX ADMIN — RISPERIDONE 1 MG: 1 SOLUTION ORAL at 20:37

## 2024-09-30 RX ADMIN — METRONIDAZOLE 500 MG: 500 TABLET ORAL at 22:18

## 2024-09-30 NOTE — CONSULTS
PRENATAL CONSULTATION NOTE     DATE: 2024  MRN: [id number]      Patient Name: Dani Trinidad  YOB: 2005    Requesting Physician:  Dr. Singleton/Dr. Colbert    EDC: 2024, by Ultrasound    GA: 29w2d    Estimated fetal weight: 1259 grams today    Reason for Consultation: potential premature single at 29-32 weeks gestation    Maternal History: 21 y.o.  presented to ER on evening of  with complaints of cramping and lower abdominal burning. No prenatal care. Ultrasound in ER and in [facility] office today showed partial uterine dehiscence (uterine window). Also positive for chlamydia and trichomonas, UTI, 2 vessel cord. Significant psychosocial history.    [facility] recommends inpatient management until delivery, which will take place at 32 weeks, if not before due to risk of labor/uterine activity.     Consult:  father and mother available for discussion.  We reviewed the fetal and  aspects of the above conditions to include: estimated survival rate, respiratory distress syndrome, beneficial effects of steroids, anemia of prematurity, feeding difficulty, temperature instability, jaundice, and hypoglycemia.    Plan for Resuscitation: full resuscitation      Offered estimation of prognosis of potential length of infant hospitalization.     We will plan to attend delivery if requested.     I discussed the importance of providing human milk for all infants, especially premature infants. The patient does not plan on providing pumped breast milk and/or nursing when appropriate given THC positive on admission. At this time mother is agreeable to donor breast milk.    I also reviewed policies and procedures for NICU/ICN admission and reviewed structure and function of [facility] - Neonatology at [facility].    Thank you for allowing us to participate in the care of this patient. [facility] is always available for follow-up consultation as indicated. Please do not hesitate to call for additional questions  or issues.    Additional Comments: Expecting a baby boy, Blaze. All questions answered, verbalized understanding. Will be happy to return for any follow-up questions.    RAUL Espinosa   Nurse Practitioner  [facility] - Neonatology  [facility]  24 @ 17:33 CDT    Consult note reviewed and electronically signed on 2024 at 17:33 CDT    INITIAL CONSULT:  A total of 45 minutes were spent on counseling and coordination of care including discussion with physicians and nursing, and reviewing the findings and recommendations with the patient and her family of which 50 percent were spent face-to-face with the patient during this encounter.    Thank you for requesting this consult. Please contact the [facility] on-call  Provider for any further questions/concerns, by calling the NICU at extension [phone].        DISCLAIMER:       At [facility], we believe that sharing information builds trust and better relationships. You are receiving this note because you or your baby are receiving care at [facility] or recently visited. It is possible you will see health information before a provider has talked with you about it. This kind of information can be easy to misunderstand. To help you fully understand what it means for your health, we urge you to discuss this note with your provider.

## 2024-09-30 NOTE — NURSING NOTE
presented to LDR stating the patient's support person was is the halls asking visitors for money for food. The  brought a $10.00 voucher for them to purchase food in the cafeteria.     Jocelyn Cervantes RN

## 2024-09-30 NOTE — PROGRESS NOTES
Assumed care of patient. Inpatient consult with [facility] for concerning area of possible dehiscence of myometrium at  section scar.    Rajani Singleton MD  2024  07:49 CDT
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
## 2024-09-30 NOTE — ED NOTES
Patient became distressed after a phone call stating that she would like to go smoke a cigarette and was nauseous. When this RN informed her she could not leave the building to smoke she stated she would like to leave. Provider Britni CARTER aware and speaking to patient at this time

## 2024-09-30 NOTE — DISCHARGE INSTRUCTIONS
Please call [phone] ([facility]) to set up prenatal care as soon as possible.

## 2024-09-30 NOTE — NURSING NOTE
Beti called back with appt time of 1:30, told to come to L&D before that time and she can go with the nurse to the appt.    Martha Leon RN  08:31 CDT

## 2024-09-30 NOTE — H&P
[facility]  Dani Trinidad  : 2005  MRN: [id number]  CSN: [id number]    History and Physical    Subjective   Dani Trinidad is a 19 y.o. year old  with an Estimated Date of Delivery: 24 currently at 29w2d presenting with some spotting, likely associated with +trich and chlamydia. On ultrasound done for no prenatal care - a less than 2cm myometrial defect was noted consistent with uterine scar dehiscence. Recommendation by MFM is for close inpatient surveillance with delivery at 32 weeks    Prenatal care has been nonexistent. Patient with numerous social issues and states did not realize she was pregnant    OB History    Para Term  AB Living   2 1 1 0 0 1   SAB IAB Ectopic Molar Multiple Live Births   0 0 0 0 0 1      # Outcome Date GA Lbr Madi/2nd Weight Sex Type Anes PTL Lv   2 Current            1 Term 22 39w1d / 00:11 3770 g (8 lb 5 oz) M CS-LTranv EPI, Gen N BRAULIO      Birth Comments: AGA      Complications: Fetal Intolerance      Name: PAMELA TRINIDAD      Apgar1: 2  Apgar5: 9     Past Medical History:   Diagnosis Date    Allergic     Allergic rhinitis     Asthma     Attention deficit disorder     Bipolar 1 disorder     Chlamydia     Depression     Fetal alcohol spectrum disorder     Fetal alcohol syndrome     Gestational diabetes     Herpes     Insomnia     Nocturnal enuresis     Seizure      Past Surgical History:   Procedure Laterality Date     SECTION N/A 7/3/2022    Procedure:  SECTION PRIMARY;  Surgeon: Jolene Bellamy DO;  Location: [facility] DELIVERY;  Service: Obstetrics;  Laterality: N/A;     SHUNT INSERTION       SHUNT REMOVAL         Current Facility-Administered Medications:     acetaminophen (TYLENOL) tablet 650 mg, 650 mg, Oral, Q6H PRN, Quirino Padilla DO    Betamethasone Sodium Phosphate injection 12 mg, 12 mg, Intramuscular, Daily, Rajani Singleton MD, 12 mg at 24 1613    calcium carbonate (TUMS) chewable  tablet 500 mg (200 mg elemental), 2 tablet, Oral, TID PRN, Quirino Padilla DO, 2 tablet at 09/30/24 0133    lactated ringers infusion, 50 mL/hr, Intravenous, Continuous, Rajani Singleton MD, Last Rate: 50 mL/hr at 09/30/24 1621, 50 mL/hr at 09/30/24 1621    metroNIDAZOLE (FLAGYL) tablet 500 mg, 500 mg, Oral, Q8H, Rajani Singleton MD, 500 mg at 09/30/24 1614    nicotine (NICODERM CQ) 21 MG/24HR patch 1 patch, 1 patch, Transdermal, Q24H, Rajani Singleton MD    ondansetron ODT (ZOFRAN-ODT) disintegrating tablet 4 mg, 4 mg, Translingual, Q6H PRN, Quirino Padilla DO, 4 mg at 09/30/24 0133    sodium chloride 0.9 % flush 10 mL, 10 mL, Intravenous, Q12H, Rajani Singleton MD    sodium chloride 0.9 % flush 10 mL, 10 mL, Intravenous, PRN, Rajani Singleton MD    Allergies   Allergen Reactions    Penicillins Anaphylaxis    Shellfish-Derived Products Anaphylaxis    Peanut-Containing Drug Products Unknown (See Comments)     positive on past allergy testing (paper scanned)    Shellfish-Derived Products Unknown (See Comments)     + on past allergy testing per Foster mother.     Penicillins Unknown (See Comments)     Positive on allergy testing     Social History    Tobacco Use      Smoking status: Every Day        Packs/day: 0.25        Years: 0.3 packs/day for 5.0 years (1.3 ttl pk-yrs)        Types: Cigarettes        Passive exposure: Current      Smokeless tobacco: Never    Review of Systems      Objective   /81 (BP Location: Right arm, Patient Position: Lying)   Pulse 70   Temp 98 °F (36.7 °C) (Oral)   Resp 16   General: well developed; well nourished  no acute distress  mentation appropriate   Heart: Not performed.   Lungs: breathing is unlabored   Abdomen: soft, non-tender; no masses  no umbilical or inguinal hernias are present  Gravid - size appropriate for dates, nontender uterus   FHT's: reassuring   Cervix: was not checked.by me, patient declined with Dr. Padilla,  normal on ultrasound   Presentation: cephalic   Contractions: none     Prenatal Labs  Lab Results   Component Value Date    HGB 9.9 (L) 09/29/2024    HEPBSAG Non-Reactive 09/30/2024    ABO AB 09/29/2024    RH Positive 09/29/2024    ABSCRN Negative 09/29/2024    [id number] Non-Reactive 09/30/2024    HEPCVIRUSABY Non-Reactive 09/30/2024    URINECX No growth 09/29/2024       Current Labs Reviewed   All prenatal labs - ordered  +Trich/Chlamydia       Assessment   IUP at 29w2d  Group B strep status:  ordered  Uterine scar dehiscence     Plan   Close maternal & fetal surveillance - inpatient    Rajani Singleton MD  9/30/2024  18:00 CDT

## 2024-09-30 NOTE — CONSULTS
[facility] consult    Dani is a 20yo   No prior recorded prenatal care  States was getting some care at [facility], but no records found  Per ED notes yesterday, was getting some care in [place], but again no notes  Reports no prior US during this pregnancy--may have only had visits in emergency depts.     Came to ED yesterday secondary cramping and lower abdominal burning type pain. Also reports some vaginal bleeding but refused either speculum or digital exam on presentation    One prior pregnancy. Delivered in  by primary c/s.     Also has many social issues and history of psych issues  Has been on multiple meds in the past. On nothing currently  Does not have custody of her last child    On US yesterday, concern for uterine scar   Since admission, both mom and fetus have appeared stable. No signs of labor    US today  Vertex presentation  Overall size is consistent with the dates from yesterday, 29 2/7 weeks  Anatomic assessment notable for a two vessel cord. No other structural anomalies visualized.   Normal fluid.   Posterior fundal placenta    In the anterior lower uterine segment there are US signs consistent with a partial uterine dehiscence (uterine window). It extends approximately  1-1.5cm both laterally and coronally. The fluid and membranes extend upward to the uterine serosa and abut the outer bladder wall. No fluid collections or signs of hematoma. Blood flow does not appear increased in the area by color Doppler.       Uterine windows are usually found in asymptomatic women and usually at time of repeat . For women where it is suspected earlier, overall management has varied and is data is quite limited.     Based upon her described symptoms from yesterday, she either had the initial separation of the scar or a widening of it to it's current size. Either way, the concern from this point would be for labor/uterine activity and the potential for a traumatic rupture of the entirety of the  scar. If that were to occur, risk for severe morbidity and/or mortality of mom and/or baby is very high.     With this in mind, recommend inpatient management until delivery  Recommend betamethasone now and again in 24hrs  Recommend close observation for any signs of labor--both by patient reported symptoms and toco  If no signs of labor, allow to eat, but closely assess prior to each meal  If any concerns for labor starting--recommend delivery  Recommend magnesium for neuroprotection with delivery (4g load, 2g/hr until baby delivered)  Recommend restarting risperidone 1mg q12hrs  SW consult  Robby consult    All questions answered. Time on floor/unit for chart review, charting, patient evaluation, discussion and coordination of care was >90 minutes with >50% in face to face counseling and coordination of care on the issues as listed above.     As always, if there are any questions/concerns, please do not hesitate to contact me.   Thanks  Mitchell  [phone]

## 2024-09-30 NOTE — NURSING NOTE
"Dr. Padilla, laborist on call, called at 2058 to be informed of patient's arrival to ER by ambulance.   Patient states she is \"6 months pregnant\" but does not know how many weeks she is or a due date. She also states that she was seen by Dr. Singleton 2 weeks ago at our hospital, but we have no record of that. Dr. Padilla would like the patient to stay in the ER until we have a confirmation of pregnancy and dating.   "

## 2024-09-30 NOTE — NURSING NOTE
Patient without reliable transportation and support. Patient stated that she lives with her dad and that for the time the FOBs mom would get temporary custody of the baby until she is able to get her own apartment. See previous ED encounters.

## 2024-09-30 NOTE — PLAN OF CARE
Goal Outcome Evaluation:  Plan of Care Reviewed With: patient        Progress: no change  Outcome Evaluation: Pt is a , GA: 29w 2d; She went to see MFM today, she was admitted for monitoing until delivery; She was given 1 dose of BMZ; LR running @ 50ml/hr; Pt reports no pain at this time; Pts S/O is at the bed side; Pt is resting between care periods, will continue to monitor.

## 2024-09-30 NOTE — NURSING NOTE
CT scan of sinuses    Bloodwork to evaluate allergic causes of your symptoms    Spirometry (lung function testing) to make sure lungs are functioning well    Follow up in 1 month after all tests are done.    Patient's baby's father's mother Beti called to come be with the patient for her M appt. States I will call her back when we get appt time.    Martha Leon RN  08:01 CDT
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
## 2024-10-01 PROBLEM — O47.00 THREATENED PRETERM LABOR: Status: ACTIVE | Noted: 2024-10-01

## 2024-10-01 LAB
BASOPHILS # BLD AUTO: 0.02 10*3/MM3 (ref 0–0.2)
BASOPHILS NFR BLD AUTO: 0.2 % (ref 0–1.5)
DEPRECATED RDW RBC AUTO: 41.5 FL (ref 37–54)
EOSINOPHIL # BLD AUTO: 0.02 10*3/MM3 (ref 0–0.4)
EOSINOPHIL NFR BLD AUTO: 0.2 % (ref 0.3–6.2)
ERYTHROCYTE [DISTWIDTH] IN BLOOD BY AUTOMATED COUNT: 13.6 % (ref 12.3–15.4)
HCT VFR BLD AUTO: 29.3 % (ref 34–46.6)
HGB BLD-MCNC: 9.3 G/DL (ref 12–15.9)
IMM GRANULOCYTES # BLD AUTO: 0.1 10*3/MM3 (ref 0–0.05)
IMM GRANULOCYTES NFR BLD AUTO: 0.8 % (ref 0–0.5)
LYMPHOCYTES # BLD AUTO: 2.22 10*3/MM3 (ref 0.7–3.1)
LYMPHOCYTES NFR BLD AUTO: 17.6 % (ref 19.6–45.3)
MCH RBC QN AUTO: 26.7 PG (ref 26.6–33)
MCHC RBC AUTO-ENTMCNC: 31.7 G/DL (ref 31.5–35.7)
MCV RBC AUTO: 84.2 FL (ref 79–97)
MONOCYTES # BLD AUTO: 1.08 10*3/MM3 (ref 0.1–0.9)
MONOCYTES NFR BLD AUTO: 8.6 % (ref 5–12)
NEUTROPHILS NFR BLD AUTO: 72.6 % (ref 42.7–76)
NEUTROPHILS NFR BLD AUTO: 9.16 10*3/MM3 (ref 1.7–7)
NRBC BLD AUTO-RTO: 0 /100 WBC (ref 0–0.2)
PLATELET # BLD AUTO: 245 10*3/MM3 (ref 140–450)
PMV BLD AUTO: 11.4 FL (ref 6–12)
RBC # BLD AUTO: 3.48 10*6/MM3 (ref 3.77–5.28)
WBC NRBC COR # BLD AUTO: 12.6 10*3/MM3 (ref 3.4–10.8)

## 2024-10-01 PROCEDURE — 85025 COMPLETE CBC W/AUTO DIFF WBC: CPT | Performed by: OBSTETRICS & GYNECOLOGY

## 2024-10-01 PROCEDURE — 25010000002 BETAMETHASONE SODIUM PHOSPHATE 12 MG/2ML SOLUTION: Performed by: OBSTETRICS & GYNECOLOGY

## 2024-10-01 RX ORDER — BETAMETHASONE SOD PHOSPH-WATER 6 MG/ML
12 VIAL (ML) INJECTION ONCE
Status: COMPLETED | OUTPATIENT
Start: 2024-10-01 | End: 2024-10-01

## 2024-10-01 RX ORDER — CLINDAMYCIN PHOSPHATE 900 MG/50ML
900 INJECTION, SOLUTION INTRAVENOUS ONCE
Status: DISCONTINUED | OUTPATIENT
Start: 2024-10-01 | End: 2024-10-03 | Stop reason: HOSPADM

## 2024-10-01 RX ORDER — FAMOTIDINE 10 MG/ML
20 INJECTION, SOLUTION INTRAVENOUS ONCE AS NEEDED
Status: DISCONTINUED | OUTPATIENT
Start: 2024-10-01 | End: 2024-10-03 | Stop reason: HOSPADM

## 2024-10-01 RX ORDER — KETOROLAC TROMETHAMINE 30 MG/ML
30 INJECTION, SOLUTION INTRAMUSCULAR; INTRAVENOUS ONCE
OUTPATIENT
Start: 2024-10-01 | End: 2024-10-01

## 2024-10-01 RX ORDER — LEVETIRACETAM 500 MG/1
500 TABLET ORAL EVERY 12 HOURS SCHEDULED
Status: DISCONTINUED | OUTPATIENT
Start: 2024-10-01 | End: 2024-10-03 | Stop reason: HOSPADM

## 2024-10-01 RX ORDER — OXYTOCIN/0.9 % SODIUM CHLORIDE 30/500 ML
250 PLASTIC BAG, INJECTION (ML) INTRAVENOUS CONTINUOUS
OUTPATIENT
Start: 2024-10-01 | End: 2024-10-01

## 2024-10-01 RX ORDER — CARBOPROST TROMETHAMINE 250 UG/ML
250 INJECTION, SOLUTION INTRAMUSCULAR
OUTPATIENT
Start: 2024-10-01

## 2024-10-01 RX ORDER — OXYTOCIN/0.9 % SODIUM CHLORIDE 30/500 ML
999 PLASTIC BAG, INJECTION (ML) INTRAVENOUS ONCE
OUTPATIENT
Start: 2024-10-01 | End: 2024-10-01

## 2024-10-01 RX ORDER — ACETAMINOPHEN 500 MG
1000 TABLET ORAL ONCE
Status: DISCONTINUED | OUTPATIENT
Start: 2024-10-01 | End: 2024-10-03 | Stop reason: HOSPADM

## 2024-10-01 RX ORDER — CITRIC ACID/SODIUM CITRATE 334-500MG
30 SOLUTION, ORAL ORAL ONCE
Status: DISCONTINUED | OUTPATIENT
Start: 2024-10-01 | End: 2024-10-03 | Stop reason: HOSPADM

## 2024-10-01 RX ORDER — MISOPROSTOL 200 UG/1
800 TABLET ORAL ONCE AS NEEDED
OUTPATIENT
Start: 2024-10-01

## 2024-10-01 RX ORDER — METHYLERGONOVINE MALEATE 0.2 MG/ML
200 INJECTION INTRAVENOUS ONCE AS NEEDED
OUTPATIENT
Start: 2024-10-01

## 2024-10-01 RX ADMIN — LEVETIRACETAM 500 MG: 500 TABLET, FILM COATED ORAL at 21:23

## 2024-10-01 RX ADMIN — METRONIDAZOLE 500 MG: 500 TABLET ORAL at 21:52

## 2024-10-01 RX ADMIN — METRONIDAZOLE 500 MG: 500 TABLET ORAL at 14:53

## 2024-10-01 RX ADMIN — Medication 12 MG: at 14:42

## 2024-10-01 RX ADMIN — RISPERIDONE 1 MG: 1 SOLUTION ORAL at 09:39

## 2024-10-01 NOTE — NURSING NOTE
"RN entered the patient's room at 2030 for NST.  Patient stated that the EFM monitor hurts her, and she wants to sleep. RN educated the patient over risks, benefits, and possible maternal and fetal death due to increase risk of uterine rupture. Patient stated, \"nothing is going to happen to the baby if I am asleep.\" MD notified of patient refusal.   "
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
## 2024-10-01 NOTE — NURSING NOTE
"Beti, JORGE's mother, called and stated her son would like to speak with the nurse that was just in the room, I tell him, that was me. He states \"I just signed a paper and he didn't want to sign it anymore since the baby probably wasn't his.\" I confirmed it was the support person consent he signed and that it would be shredded. I told him he was not welcome to return to the unit at this time.    Martha Leon RN  15:47 CDT    "
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
## 2024-10-01 NOTE — NURSING NOTE
Patient walked off the unit to smoke against medical advice. AMA form signed stating she understands the risks she's taking by leaving the floor.

## 2024-10-01 NOTE — PLAN OF CARE
Goal Outcome Evaluation:  Plan of Care Reviewed With: patient        Progress: no change  Outcome Evaluation:  29w3d; admitted until delivery due to uterine scar dehiscence; no prenatal care; 2nd dose of BMZ will be given today, LR nfusing at 50 mL/hr, Flagyl 500 mg q8h, Risperdal 1 mg q12h; patient reported 0/10 all night and slept in between care; VSS

## 2024-10-01 NOTE — NURSING NOTE
Patient returned from smoking. Asked her to sign an AMA form to state she understands the risks she's taking by leaving the floor.     Martha Leon RN  15:48 CDT
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
## 2024-10-01 NOTE — NURSING NOTE
"Patient asked if JCALVIN could come back to the hospital. Dr. Chavarria states he would be allowed to come back if he agreed to be searched by security. I told the patient this and she agreed and asked for me to call. I agreed and called Beti's number. I told Beti that she was requesting JORGE to come back but the stipulation is that he agrees to be searched. She relays this information and he begins yelling in the background that he's not coming back, the baby isn't his and for her to \"f\" off. Beti begins talking over him to apologize and says gareth.    Martha Leon RN  18:49 CDT    "
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
## 2024-10-01 NOTE — NURSING NOTE
Patient walked off the unit to smoke against medical advice @ 1741. AMA form signed stating she understands the risks she's taking by leaving the floor.

## 2024-10-01 NOTE — NURSING NOTE
"Pt off the unit per providers okay from [id number]. Pt stated she hit a bump while in the wheelchair, she then started to have cramping in her side. She is rating the cramping a 10/10. She stated, \"It feels like I'm having contractions.\" MD notified; 2nd dose of BMZ given, Pt is now NPO.   "

## 2024-10-02 LAB
ABO GROUP BLD: NORMAL
AMPHET+METHAMPHET UR QL: NEGATIVE
AMPHETAMINES UR QL: NEGATIVE
BARBITURATES UR QL SCN: NEGATIVE
BENZODIAZ UR QL SCN: NEGATIVE
BLD GP AB SCN SERPL QL: NEGATIVE
BUPRENORPHINE SERPL-MCNC: NEGATIVE NG/ML
CANNABINOIDS SERPL QL: POSITIVE
COCAINE UR QL: NEGATIVE
FENTANYL UR-MCNC: NEGATIVE NG/ML
METHADONE UR QL SCN: NEGATIVE
OPIATES UR QL: NEGATIVE
OXYCODONE UR QL SCN: NEGATIVE
PCP UR QL SCN: NEGATIVE
RH BLD: POSITIVE
T&S EXPIRATION DATE: NORMAL
TRICYCLICS UR QL SCN: NEGATIVE

## 2024-10-02 PROCEDURE — 86850 RBC ANTIBODY SCREEN: CPT | Performed by: OBSTETRICS & GYNECOLOGY

## 2024-10-02 PROCEDURE — G0480 DRUG TEST DEF 1-7 CLASSES: HCPCS | Performed by: OBSTETRICS & GYNECOLOGY

## 2024-10-02 PROCEDURE — 86901 BLOOD TYPING SEROLOGIC RH(D): CPT | Performed by: OBSTETRICS & GYNECOLOGY

## 2024-10-02 PROCEDURE — 63710000001 ONDANSETRON ODT 4 MG TABLET DISPERSIBLE: Performed by: OBSTETRICS & GYNECOLOGY

## 2024-10-02 PROCEDURE — 80307 DRUG TEST PRSMV CHEM ANLYZR: CPT | Performed by: OBSTETRICS & GYNECOLOGY

## 2024-10-02 PROCEDURE — 86900 BLOOD TYPING SEROLOGIC ABO: CPT | Performed by: OBSTETRICS & GYNECOLOGY

## 2024-10-02 RX ORDER — POLYETHYLENE GLYCOL 3350 17 G/17G
17 POWDER, FOR SOLUTION ORAL DAILY
Status: DISCONTINUED | OUTPATIENT
Start: 2024-10-02 | End: 2024-10-03

## 2024-10-02 RX ORDER — CEFDINIR 300 MG/1
300 CAPSULE ORAL EVERY 12 HOURS SCHEDULED
Status: DISCONTINUED | OUTPATIENT
Start: 2024-10-02 | End: 2024-10-03 | Stop reason: HOSPADM

## 2024-10-02 RX ADMIN — METRONIDAZOLE 500 MG: 500 TABLET ORAL at 21:09

## 2024-10-02 RX ADMIN — POLYETHYLENE GLYCOL 3350 17 G: 17 POWDER, FOR SOLUTION ORAL at 21:08

## 2024-10-02 RX ADMIN — Medication 10 ML: at 21:09

## 2024-10-02 RX ADMIN — RISPERIDONE 1 MG: 1 SOLUTION ORAL at 21:09

## 2024-10-02 RX ADMIN — RISPERIDONE 1 MG: 1 SOLUTION ORAL at 10:29

## 2024-10-02 RX ADMIN — LEVETIRACETAM 500 MG: 500 TABLET, FILM COATED ORAL at 21:09

## 2024-10-02 RX ADMIN — CEFDINIR 300 MG: 300 CAPSULE ORAL at 10:31

## 2024-10-02 RX ADMIN — Medication 10 ML: at 09:00

## 2024-10-02 RX ADMIN — CEFDINIR 300 MG: 300 CAPSULE ORAL at 21:09

## 2024-10-02 RX ADMIN — ONDANSETRON 4 MG: 4 TABLET, ORALLY DISINTEGRATING ORAL at 16:05

## 2024-10-02 RX ADMIN — METRONIDAZOLE 500 MG: 500 TABLET ORAL at 10:32

## 2024-10-02 RX ADMIN — LEVETIRACETAM 500 MG: 500 TABLET, FILM COATED ORAL at 10:31

## 2024-10-02 NOTE — NURSING NOTE
Charge nurse and primary RN entered the pt's room to adjust EFM. Patient stated the EFM monitor made her nauseous and refused any further monitoring of the FHT. Patient allowed TOCO monitoring.

## 2024-10-02 NOTE — PROGRESS NOTES
Hospital day 2  Pt denies any problems, she is anxious and expresses anger easily,  hx taken  Reports good mov  She has been monitored severeal times today, fht's cat 1  A: 29 week gestation  On respiradol  Started on keppra pending  neurology consultation.  Plan to continue surveillance, in spite the lack of cooperation from pt.

## 2024-10-02 NOTE — NURSING NOTE
"Patient refuses fetal monitoring on second attempt to obtain one hour fetal strip. Patient stated \" I am not hungry and do not want you to talk to me, get out\". Will attempt to obtain fetal monitoring at a future time.   "

## 2024-10-02 NOTE — NURSING NOTE
Patients room completely cleaned up. Patient stated that she had vomited in the floor yesterday and it was never cleaned up, so I mopped the entire room to ensure there were no remnants. The trash was also over flowing so the trash was taken out and new bag placed into room,  it was seen that there was a large amount of urine in one of the trash cans that the patient mentioned throwing up in. Towels and clothing were laying in the bathroom floor, picked up and placed in the dirty linen and patient belongings bag and new linens were provided. Assisted patient in removing dirty sheets from bed and provided new linen and fresh blankets to ensure patient comfort. Assistance provided by Cristina HUBBARD RN.

## 2024-10-02 NOTE — NURSING NOTE
Telehealth with MD Darrion conducted at 1425, pt history discussed with physician. Patient at this time refused to both myself and the physician to take part in the conversation. Discussed with physician that he will be reviewing her chart and will reach out with recommendations.

## 2024-10-02 NOTE — NURSING NOTE
Patient refused EFM and vital signs. Patient educated over risks, benefits, and possible maternal and fetal death. MD notified of patient refusal.

## 2024-10-02 NOTE — NURSING NOTE
"Patient contacted family and stated \" She would like JJ to return to hospital so that they can shower together and the baby might come tomorrow\". Patient also requested to go outside and smoke which she was informed that she would have her IV removed and would be discharged if she leaves the unit due to advisement of staying on unit due to uterine scar dehiscence. Patient states \" I will just stay up here and go to sleep\".   "
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
## 2024-10-02 NOTE — NURSING NOTE
Case Management consulted and discussed patient plan, planning on coming up to unit to have discussion with patient.

## 2024-10-02 NOTE — PLAN OF CARE
Goal Outcome Evaluation:  Plan of Care Reviewed With: patient           Outcome Evaluation: , 29w4d, Admitted until delivery due to uterine scar dehiscence, No PNC, 2 doses of BMZ administered. Pt IID. Flagyl 500 mg due at . Patient has not had complaints of pain today and has slept in between care. Room was mopped and cleaned up. Patient just showered. 1 Hr fetal monitoring q shift. VSS.  consulted today, neurology consult put in, and pyschiatric consult scheduled for 12 tomorrow.

## 2024-10-03 ENCOUNTER — HOSPITAL ENCOUNTER (INPATIENT)
Facility: HOSPITAL | Age: 19
LOS: 1 days | Discharge: LEFT BEFORE DISCHARGE | DRG: 776 | End: 2024-10-04
Attending: OBSTETRICS & GYNECOLOGY | Admitting: OBSTETRICS & GYNECOLOGY
Payer: MEDICAID

## 2024-10-03 VITALS
RESPIRATION RATE: 18 BRPM | TEMPERATURE: 97.7 F | SYSTOLIC BLOOD PRESSURE: 117 MMHG | DIASTOLIC BLOOD PRESSURE: 68 MMHG | HEART RATE: 93 BPM

## 2024-10-03 PROBLEM — G40.909 SEIZURE DISORDER DURING PREGNANCY: Status: ACTIVE | Noted: 2024-10-03

## 2024-10-03 PROBLEM — O99.820 GBS (GROUP B STREPTOCOCCUS CARRIER), +RV CULTURE, CURRENTLY PREGNANT: Status: ACTIVE | Noted: 2024-10-03

## 2024-10-03 PROBLEM — O71.00 DEHISCENCE (WITHOUT EXTENSION) OF OLD UTERINE SCAR BEFORE ONSET OF LABOR, ANTEPARTUM CONDITION OR COMPLICATION: Status: ACTIVE | Noted: 2024-10-03

## 2024-10-03 PROBLEM — O99.343 BIPOLAR DISEASE DURING PREGNANCY IN THIRD TRIMESTER: Status: ACTIVE | Noted: 2024-10-03

## 2024-10-03 PROBLEM — F31.9 BIPOLAR DISEASE DURING PREGNANCY IN THIRD TRIMESTER: Status: ACTIVE | Noted: 2024-10-03

## 2024-10-03 PROBLEM — O99.350 SEIZURE DISORDER DURING PREGNANCY: Status: ACTIVE | Noted: 2024-10-03

## 2024-10-03 RX ORDER — RISPERIDONE 1 MG/ML
1 SOLUTION ORAL EVERY 12 HOURS SCHEDULED
Status: DISCONTINUED | OUTPATIENT
Start: 2024-10-03 | End: 2024-10-04 | Stop reason: HOSPADM

## 2024-10-03 RX ORDER — POLYETHYLENE GLYCOL 3350 17 G/17G
17 POWDER, FOR SOLUTION ORAL DAILY PRN
Status: DISCONTINUED | OUTPATIENT
Start: 2024-10-03 | End: 2024-10-03 | Stop reason: HOSPADM

## 2024-10-03 RX ORDER — RISPERIDONE 1 MG/ML
1 SOLUTION ORAL NIGHTLY
Status: DISCONTINUED | OUTPATIENT
Start: 2024-10-03 | End: 2024-10-03

## 2024-10-03 RX ORDER — SODIUM CHLORIDE 0.9 % (FLUSH) 0.9 %
10 SYRINGE (ML) INJECTION AS NEEDED
Status: DISCONTINUED | OUTPATIENT
Start: 2024-10-03 | End: 2024-10-04 | Stop reason: HOSPADM

## 2024-10-03 RX ORDER — DOCUSATE SODIUM 100 MG/1
100 CAPSULE, LIQUID FILLED ORAL 2 TIMES DAILY PRN
Status: DISCONTINUED | OUTPATIENT
Start: 2024-10-03 | End: 2024-10-04 | Stop reason: HOSPADM

## 2024-10-03 RX ORDER — ACETAMINOPHEN 325 MG/1
650 TABLET ORAL EVERY 4 HOURS PRN
Status: DISCONTINUED | OUTPATIENT
Start: 2024-10-03 | End: 2024-10-04 | Stop reason: HOSPADM

## 2024-10-03 RX ORDER — LEVETIRACETAM 500 MG/1
500 TABLET ORAL 2 TIMES DAILY
Status: DISCONTINUED | OUTPATIENT
Start: 2024-10-03 | End: 2024-10-04 | Stop reason: HOSPADM

## 2024-10-03 RX ORDER — METRONIDAZOLE 500 MG/1
500 TABLET ORAL EVERY 12 HOURS SCHEDULED
Status: DISCONTINUED | OUTPATIENT
Start: 2024-10-03 | End: 2024-10-03 | Stop reason: HOSPADM

## 2024-10-03 RX ORDER — ONDANSETRON 4 MG/1
8 TABLET, ORALLY DISINTEGRATING ORAL EVERY 8 HOURS PRN
Status: DISCONTINUED | OUTPATIENT
Start: 2024-10-03 | End: 2024-10-04 | Stop reason: HOSPADM

## 2024-10-03 RX ORDER — NICOTINE 21 MG/24HR
1 PATCH, TRANSDERMAL 24 HOURS TRANSDERMAL
Status: DISCONTINUED | OUTPATIENT
Start: 2024-10-03 | End: 2024-10-04 | Stop reason: HOSPADM

## 2024-10-03 RX ORDER — BISACODYL 10 MG
10 SUPPOSITORY, RECTAL RECTAL DAILY PRN
Status: DISCONTINUED | OUTPATIENT
Start: 2024-10-03 | End: 2024-10-04 | Stop reason: HOSPADM

## 2024-10-03 RX ORDER — SODIUM CHLORIDE 0.9 % (FLUSH) 0.9 %
10 SYRINGE (ML) INJECTION EVERY 12 HOURS SCHEDULED
Status: DISCONTINUED | OUTPATIENT
Start: 2024-10-03 | End: 2024-10-04 | Stop reason: HOSPADM

## 2024-10-03 RX ORDER — ONDANSETRON 2 MG/ML
4 INJECTION INTRAMUSCULAR; INTRAVENOUS EVERY 8 HOURS PRN
Status: DISCONTINUED | OUTPATIENT
Start: 2024-10-03 | End: 2024-10-04 | Stop reason: HOSPADM

## 2024-10-03 RX ORDER — DOCUSATE SODIUM 100 MG/1
100 CAPSULE, LIQUID FILLED ORAL 2 TIMES DAILY
Status: DISCONTINUED | OUTPATIENT
Start: 2024-10-03 | End: 2024-10-03 | Stop reason: HOSPADM

## 2024-10-03 RX ORDER — BISACODYL 10 MG
10 SUPPOSITORY, RECTAL RECTAL DAILY PRN
Status: DISCONTINUED | OUTPATIENT
Start: 2024-10-03 | End: 2024-10-03 | Stop reason: HOSPADM

## 2024-10-03 RX ORDER — METRONIDAZOLE 500 MG/1
500 TABLET ORAL EVERY 12 HOURS SCHEDULED
Status: DISCONTINUED | OUTPATIENT
Start: 2024-10-03 | End: 2024-10-04 | Stop reason: HOSPADM

## 2024-10-03 RX ORDER — CEFDINIR 300 MG/1
300 CAPSULE ORAL EVERY 12 HOURS SCHEDULED
Status: DISCONTINUED | OUTPATIENT
Start: 2024-10-03 | End: 2024-10-04 | Stop reason: HOSPADM

## 2024-10-03 RX ADMIN — CEFDINIR 300 MG: 300 CAPSULE ORAL at 09:32

## 2024-10-03 RX ADMIN — LEVETIRACETAM 500 MG: 500 TABLET, FILM COATED ORAL at 09:31

## 2024-10-03 RX ADMIN — Medication 10 ML: at 09:33

## 2024-10-03 RX ADMIN — RISPERIDONE 1 MG: 1 SOLUTION ORAL at 09:32

## 2024-10-03 RX ADMIN — METRONIDAZOLE 500 MG: 500 TABLET ORAL at 09:31

## 2024-10-03 NOTE — PROGRESS NOTES
MFM FU    Now 29 5/7 weeks    Since admission, no signs of labor but Dani has not been overly compliant with monitoring along with some other concerns raised in peoples notes  Note psych input as well as SW input--all appreciated    Currently  VSS  No signs of labor  Fetus appears stable    Tried to discuss situation with Dani again however her willingness to listen was limited  Encouraged her to continue to work with the treatment team     scar dehiscence with uterine window  Currently stable but mom and baby remains at significant risk    Continue to recommend inpatient management until delivery  If any clinical concern for labor, recommend urgent .   Magnesium for neuro protection (if time allows) but would not wait for mag bolus if mom is laboring.     Recommend delivery at 32.0 weeks, sooner if clinical situation changes (ie labor).     All questions answered. Time on floor/unit for chart review, charting, patient evaluation, discussion and coordination of care was >40 minutes with >50% in face to face counseling and coordination of care on the issues as listed above.     As always, if there are any questions/concerns, please do not hesitate to contact me.   Thanks  Mitchell  [phone]
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
## 2024-10-03 NOTE — PLAN OF CARE
Goal Outcome Evaluation:  Plan of Care Reviewed With: patient        Progress: no change  Outcome Evaluation:  29w5d: admitted until delivery due to uterine scar dehiscence, 2 doses of BMZ, fetal monitoring q shift, no complaints of pain
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
## 2024-10-03 NOTE — NURSING NOTE
"Pt wanting to leave and go smoke. Pt not informed that she cannot go smoke.  Pt angry and left room headed out of LDR.  Pt asked to take IV access out and pt kept walking and saying \"I don't care and I am leaving!\"  Dr Padilla, security, and hospital supervisor notified.  "

## 2024-10-03 NOTE — NURSING NOTE
"Phone call received from Dani' grandmother stating, \"Why did you let her leave?\" I explained to the woman that we cannot keep Dani here against her will. The grandmother said that Dani called her from [facility] on [address] and wanted the grandma to get her from there. Security notified which in turn notified [place] [employer].  "

## 2024-10-03 NOTE — NURSING NOTE
Pt c/o pain in lower abdomen and back.  Dr lentz called and at beside.  Pt pacing up and down hallways and refusing to talk to doctor.  Trying to encourage pt to let us monitor baby and uterus, but pt refuses.  Pt refusing any medication and/or care.

## 2024-10-03 NOTE — NURSING NOTE
"Took patient breakfast tray and encouraged pt to eat so she can take medication. Pt angry and refusing any care at this time.  Asked pt to monitor contractions and fetus and pt refuses.  Pt keeps stating \"Leave me alone!  Quit waking me up!\"   "

## 2024-10-03 NOTE — NURSING NOTE
Pt wanting taken off monitors at this time.  Pt requesting something other than miralax for a bowl movement.  Dr Padilla notified

## 2024-10-04 ENCOUNTER — HOSPITAL ENCOUNTER (INPATIENT)
Facility: HOSPITAL | Age: 19
LOS: 5 days | Discharge: HOME | DRG: 776 | End: 2024-10-09
Attending: OBSTETRICS & GYNECOLOGY | Admitting: OBSTETRICS & GYNECOLOGY
Payer: MEDICAID

## 2024-10-04 VITALS
TEMPERATURE: 97.9 F | HEART RATE: 95 BPM | SYSTOLIC BLOOD PRESSURE: 135 MMHG | WEIGHT: 182 LBS | RESPIRATION RATE: 16 BRPM | BODY MASS INDEX: 29.25 KG/M2 | HEIGHT: 66 IN | DIASTOLIC BLOOD PRESSURE: 86 MMHG

## 2024-10-04 PROBLEM — O71.00 DEHISCENCE (WITHOUT EXTENSION) OF OLD UTERINE SCAR BEFORE ONSET OF LABOR, ANTEPARTUM CONDITION OR COMPLICATION: Status: RESOLVED | Noted: 2024-10-03 | Resolved: 2024-10-04

## 2024-10-04 PROBLEM — O71.00 DEHISCENCE (WITHOUT EXTENSION) OF OLD UTERINE SCAR BEFORE ONSET OF LABOR, ANTEPARTUM CONDITION OR COMPLICATION: Status: ACTIVE | Noted: 2024-10-04

## 2024-10-04 LAB
ABO GROUP BLD: NORMAL
BACTERIA SPEC AEROBE CULT: ABNORMAL
BASOPHILS # BLD AUTO: 0.03 10*3/MM3 (ref 0–0.2)
BASOPHILS NFR BLD AUTO: 0.3 % (ref 0–1.5)
BLD GP AB SCN SERPL QL: NEGATIVE
DEPRECATED RDW RBC AUTO: 41.8 FL (ref 37–54)
EOSINOPHIL # BLD AUTO: 0.15 10*3/MM3 (ref 0–0.4)
EOSINOPHIL NFR BLD AUTO: 1.5 % (ref 0.3–6.2)
ERYTHROCYTE [DISTWIDTH] IN BLOOD BY AUTOMATED COUNT: 13.5 % (ref 12.3–15.4)
HCT VFR BLD AUTO: 28.7 % (ref 34–46.6)
HGB BLD-MCNC: 9.1 G/DL (ref 12–15.9)
IMM GRANULOCYTES # BLD AUTO: 0.1 10*3/MM3 (ref 0–0.05)
IMM GRANULOCYTES NFR BLD AUTO: 1 % (ref 0–0.5)
LYMPHOCYTES # BLD AUTO: 2.43 10*3/MM3 (ref 0.7–3.1)
LYMPHOCYTES NFR BLD AUTO: 23.7 % (ref 19.6–45.3)
MCH RBC QN AUTO: 26.8 PG (ref 26.6–33)
MCHC RBC AUTO-ENTMCNC: 31.7 G/DL (ref 31.5–35.7)
MCV RBC AUTO: 84.4 FL (ref 79–97)
MONOCYTES # BLD AUTO: 0.78 10*3/MM3 (ref 0.1–0.9)
MONOCYTES NFR BLD AUTO: 7.6 % (ref 5–12)
NEUTROPHILS NFR BLD AUTO: 6.77 10*3/MM3 (ref 1.7–7)
NEUTROPHILS NFR BLD AUTO: 65.9 % (ref 42.7–76)
NRBC BLD AUTO-RTO: 0 /100 WBC (ref 0–0.2)
PLATELET # BLD AUTO: 248 10*3/MM3 (ref 140–450)
PMV BLD AUTO: 11.9 FL (ref 6–12)
RBC # BLD AUTO: 3.4 10*6/MM3 (ref 3.77–5.28)
RH BLD: POSITIVE
T&S EXPIRATION DATE: NORMAL
WBC NRBC COR # BLD AUTO: 10.26 10*3/MM3 (ref 3.4–10.8)

## 2024-10-04 PROCEDURE — 85025 COMPLETE CBC W/AUTO DIFF WBC: CPT | Performed by: OBSTETRICS & GYNECOLOGY

## 2024-10-04 PROCEDURE — 86850 RBC ANTIBODY SCREEN: CPT | Performed by: OBSTETRICS & GYNECOLOGY

## 2024-10-04 PROCEDURE — 86901 BLOOD TYPING SEROLOGIC RH(D): CPT | Performed by: OBSTETRICS & GYNECOLOGY

## 2024-10-04 PROCEDURE — 86900 BLOOD TYPING SEROLOGIC ABO: CPT | Performed by: OBSTETRICS & GYNECOLOGY

## 2024-10-04 PROCEDURE — 59025 FETAL NON-STRESS TEST: CPT

## 2024-10-04 RX ORDER — ONDANSETRON 2 MG/ML
4 INJECTION INTRAMUSCULAR; INTRAVENOUS EVERY 8 HOURS PRN
Status: DISCONTINUED | OUTPATIENT
Start: 2024-10-04 | End: 2024-10-09 | Stop reason: HOSPADM

## 2024-10-04 RX ORDER — SODIUM CHLORIDE 0.9 % (FLUSH) 0.9 %
10 SYRINGE (ML) INJECTION AS NEEDED
Status: DISCONTINUED | OUTPATIENT
Start: 2024-10-04 | End: 2024-10-09 | Stop reason: HOSPADM

## 2024-10-04 RX ORDER — SIMETHICONE 80 MG
80 TABLET,CHEWABLE ORAL 4 TIMES DAILY PRN
Status: DISCONTINUED | OUTPATIENT
Start: 2024-10-04 | End: 2024-10-04 | Stop reason: HOSPADM

## 2024-10-04 RX ORDER — CEFDINIR 300 MG/1
300 CAPSULE ORAL EVERY 12 HOURS SCHEDULED
Status: DISCONTINUED | OUTPATIENT
Start: 2024-10-05 | End: 2024-10-06

## 2024-10-04 RX ORDER — BISACODYL 10 MG
10 SUPPOSITORY, RECTAL RECTAL DAILY PRN
Status: DISCONTINUED | OUTPATIENT
Start: 2024-10-04 | End: 2024-10-09 | Stop reason: HOSPADM

## 2024-10-04 RX ORDER — SODIUM CHLORIDE 0.9 % (FLUSH) 0.9 %
10 SYRINGE (ML) INJECTION EVERY 12 HOURS SCHEDULED
Status: DISCONTINUED | OUTPATIENT
Start: 2024-10-04 | End: 2024-10-09 | Stop reason: HOSPADM

## 2024-10-04 RX ORDER — RISPERIDONE 1 MG/ML
1 SOLUTION ORAL EVERY 12 HOURS SCHEDULED
Status: DISCONTINUED | OUTPATIENT
Start: 2024-10-05 | End: 2024-10-09 | Stop reason: HOSPADM

## 2024-10-04 RX ORDER — METRONIDAZOLE 500 MG/1
500 TABLET ORAL EVERY 12 HOURS SCHEDULED
Status: DISCONTINUED | OUTPATIENT
Start: 2024-10-05 | End: 2024-10-06

## 2024-10-04 RX ORDER — ACETAMINOPHEN 325 MG/1
650 TABLET ORAL EVERY 4 HOURS PRN
Status: DISCONTINUED | OUTPATIENT
Start: 2024-10-04 | End: 2024-10-09 | Stop reason: HOSPADM

## 2024-10-04 RX ORDER — ONDANSETRON 4 MG/1
8 TABLET, ORALLY DISINTEGRATING ORAL EVERY 8 HOURS PRN
Status: DISCONTINUED | OUTPATIENT
Start: 2024-10-04 | End: 2024-10-04

## 2024-10-04 RX ORDER — ONDANSETRON 4 MG/1
8 TABLET, ORALLY DISINTEGRATING ORAL EVERY 8 HOURS PRN
Status: DISCONTINUED | OUTPATIENT
Start: 2024-10-04 | End: 2024-10-09 | Stop reason: HOSPADM

## 2024-10-04 RX ORDER — DOCUSATE SODIUM 100 MG/1
100 CAPSULE, LIQUID FILLED ORAL 2 TIMES DAILY PRN
Status: DISCONTINUED | OUTPATIENT
Start: 2024-10-04 | End: 2024-10-09 | Stop reason: HOSPADM

## 2024-10-04 RX ORDER — CYCLOBENZAPRINE HCL 10 MG
10 TABLET ORAL 3 TIMES DAILY PRN
Status: DISCONTINUED | OUTPATIENT
Start: 2024-10-04 | End: 2024-10-04 | Stop reason: HOSPADM

## 2024-10-04 RX ORDER — LEVETIRACETAM 500 MG/1
500 TABLET ORAL 2 TIMES DAILY
Status: DISCONTINUED | OUTPATIENT
Start: 2024-10-05 | End: 2024-10-09 | Stop reason: HOSPADM

## 2024-10-04 RX ADMIN — CEFDINIR 300 MG: 300 CAPSULE ORAL at 09:13

## 2024-10-04 RX ADMIN — DOCUSATE SODIUM 100 MG: 100 CAPSULE, LIQUID FILLED ORAL at 20:36

## 2024-10-04 RX ADMIN — CEFDINIR 300 MG: 300 CAPSULE ORAL at 20:36

## 2024-10-04 RX ADMIN — LEVETIRACETAM 500 MG: 500 TABLET, FILM COATED ORAL at 09:13

## 2024-10-04 RX ADMIN — LEVETIRACETAM 500 MG: 500 TABLET, FILM COATED ORAL at 20:35

## 2024-10-04 RX ADMIN — METRONIDAZOLE 500 MG: 500 TABLET ORAL at 09:13

## 2024-10-04 RX ADMIN — RISPERIDONE 1 MG: 1 SOLUTION ORAL at 09:13

## 2024-10-04 RX ADMIN — Medication 10 ML: at 22:30

## 2024-10-04 RX ADMIN — METRONIDAZOLE 500 MG: 500 TABLET ORAL at 20:35

## 2024-10-04 RX ADMIN — Medication 10 ML: at 09:00

## 2024-10-04 RX ADMIN — RISPERIDONE 1 MG: 1 SOLUTION ORAL at 20:37

## 2024-10-04 NOTE — NURSING NOTE
Returned to pt room per her request in 1 hour. Pt still refusing EFM and all medications at this time.

## 2024-10-04 NOTE — NURSING NOTE
Patient has been very pleasant today and respectful. She tolerated monitoring for one hour as well as vitals and taking medications. Pt currently states that she has no pain just slight nausea.

## 2024-10-04 NOTE — NURSING NOTE
Pt refusing all medications and EFM at this time. Pt asks me to return in 1 hour and wake her up.

## 2024-10-04 NOTE — PLAN OF CARE
Goal Outcome Evaluation:  Plan of Care Reviewed With: patient        Progress: no change  Outcome Evaluation: pt has reported 0/10 pain. vitals have all been stable. EFM completed and met
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
## 2024-10-04 NOTE — PROGRESS NOTES
Nutrition Services    Patient Name:  Dani Trinidad  YOB: 2005  MRN: [id number]  Admit Date:  10/3/2024    Additional foods requested by pt/staff. Approved high calorie, high pro and modified diet order to high pro (no high calorie modifier in EHR at this time). Okay for two portions of items for patient. If guest tray or courtesy tray warranted, please notify FNS team. Nutrition services following per protocol.    Electronically signed by:  Martha Chow RDN, GENE  10/04/24 08:59 CDT

## 2024-10-04 NOTE — NURSING NOTE
"Pt up out of bed making coffee. When asked if I could put her on EFM she refused and stated \"he's moving too much.\"   "

## 2024-10-05 PROCEDURE — 63710000001 ONDANSETRON ODT 4 MG TABLET DISPERSIBLE: Performed by: OBSTETRICS & GYNECOLOGY

## 2024-10-05 RX ORDER — FLUCONAZOLE 150 MG/1
150 TABLET ORAL ONCE
Status: COMPLETED | OUTPATIENT
Start: 2024-10-05 | End: 2024-10-05

## 2024-10-05 RX ADMIN — RISPERIDONE 1 MG: 1 SOLUTION ORAL at 09:11

## 2024-10-05 RX ADMIN — ONDANSETRON 8 MG: 4 TABLET, ORALLY DISINTEGRATING ORAL at 09:11

## 2024-10-05 RX ADMIN — CEFDINIR 300 MG: 300 CAPSULE ORAL at 20:55

## 2024-10-05 RX ADMIN — ONDANSETRON 8 MG: 4 TABLET, ORALLY DISINTEGRATING ORAL at 20:55

## 2024-10-05 RX ADMIN — Medication 10 ML: at 20:50

## 2024-10-05 RX ADMIN — METRONIDAZOLE 500 MG: 500 TABLET ORAL at 20:55

## 2024-10-05 RX ADMIN — LEVETIRACETAM 500 MG: 500 TABLET, FILM COATED ORAL at 20:55

## 2024-10-05 RX ADMIN — FLUCONAZOLE 150 MG: 150 TABLET ORAL at 23:00

## 2024-10-05 RX ADMIN — LEVETIRACETAM 500 MG: 500 TABLET, FILM COATED ORAL at 09:11

## 2024-10-05 RX ADMIN — CEFDINIR 300 MG: 300 CAPSULE ORAL at 09:11

## 2024-10-05 RX ADMIN — METRONIDAZOLE 500 MG: 500 TABLET ORAL at 09:11

## 2024-10-05 RX ADMIN — RISPERIDONE 1 MG: 1 SOLUTION ORAL at 20:58

## 2024-10-05 NOTE — NURSING NOTE
The patient SO JORGE just arrived at the hospital and security notified due to being told to notify them on Wednesday and not being told to do something else otherwise.

## 2024-10-05 NOTE — PLAN OF CARE
Goal Outcome Evaluation:              Outcome Evaluation: Pt was complaint today with remaining on unit, taking medicine, and allowing for fetal monitoring. Pt will need iron infusion tonight. Pt denies any pain, VSS, no new complaints.

## 2024-10-05 NOTE — PROGRESS NOTES
Pt denies any problems, ros neg  Good mov  Nst cat 1, no decels,  Labs hgb 9.1  Exam unchanged   A: stable  Uterine incision defect  Anemia  30 week gestation  Plan iv iron

## 2024-10-05 NOTE — NURSING NOTE
Pt asked for belt phone to call SO but said that she is to tired to do an assessment or monitoring. I told patient to just let me know when she was ready to be monitored and take morning medications

## 2024-10-05 NOTE — NURSING NOTE
Financial  came to evaluate the pt for coverage. Pt was laying in bed with SO and asked to not be bothered. I explained the employee would be leaving at noon and pt agreed to talk to her at 11am.
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
## 2024-10-05 NOTE — PLAN OF CARE
Goal Outcome Evaluation:  Plan of Care Reviewed With: patient        Progress: no change  Outcome Evaluation: After leaving AMA, pt eventually returns to LDR. Patient was compliant with the readmission process and has slept throughout the night without significant other at bedside.

## 2024-10-05 NOTE — NURSING NOTE
Upon entering patient's room at shift change, the room was cluttered, trash overflowing in trash cans and on bedside table, linens on bed dirty, and the floor covered in crumbs. After the patient and s/o's approval to tidy and clean the room to a certain extent, all of the linen and trash was taken out as well as replaced with new bags, bed sheets and pillow cases were completely changed with fresh linens, and the floors were swept/mopped.

## 2024-10-05 NOTE — NURSING NOTE
Upon entering the patient's room after the door slamming, patient is yelling down the hallway at s/o and asking to leave AMA. IV removed and AMA paper signed by pt.
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
## 2024-10-05 NOTE — NURSING NOTE
Pt refused morning assessment asked to not be woke up until breakfast trays arrive. Will attempt vitals and assessment then.

## 2024-10-05 NOTE — NURSING NOTE
Pt is cleaning room states she will do the iron infusion, vitals, and fetal monitoring before bed sometime after dinner. Pt states she can not sit in the bed that long right now.

## 2024-10-06 LAB
BACTERIA UR QL AUTO: ABNORMAL /HPF
BILIRUB UR QL STRIP: NEGATIVE
CLARITY UR: CLEAR
COLOR UR: YELLOW
CONV CLASS DESCRIPTION: NORMAL
GLUCOSE UR STRIP-MCNC: NEGATIVE MG/DL
HGB UR QL STRIP.AUTO: NEGATIVE
HYALINE CASTS UR QL AUTO: ABNORMAL /LPF
KETONES UR QL STRIP: NEGATIVE
LEUKOCYTE ESTERASE UR QL STRIP.AUTO: ABNORMAL
NITRITE UR QL STRIP: NEGATIVE
PENICILLIN G IGE QN: <0.1 KU/L
PENICILLIN V IGE QN: <0.1 KU/L
PH UR STRIP.AUTO: 8 [PH] (ref 5–8)
PROT UR QL STRIP: NEGATIVE
RBC # UR STRIP: ABNORMAL /HPF
REF LAB TEST METHOD: ABNORMAL
SP GR UR STRIP: 1.01 (ref 1–1.03)
SQUAMOUS #/AREA URNS HPF: ABNORMAL /HPF
UROBILINOGEN UR QL STRIP: ABNORMAL
WBC # UR STRIP: ABNORMAL /HPF

## 2024-10-06 PROCEDURE — 87086 URINE CULTURE/COLONY COUNT: CPT | Performed by: OBSTETRICS & GYNECOLOGY

## 2024-10-06 PROCEDURE — 81001 URINALYSIS AUTO W/SCOPE: CPT | Performed by: OBSTETRICS & GYNECOLOGY

## 2024-10-06 RX ORDER — NITROFURANTOIN 25; 75 MG/1; MG/1
100 CAPSULE ORAL 2 TIMES DAILY
Status: DISCONTINUED | OUTPATIENT
Start: 2024-10-06 | End: 2024-10-06 | Stop reason: SDUPTHER

## 2024-10-06 RX ORDER — OXYCODONE AND ACETAMINOPHEN 5; 325 MG/1; MG/1
1 TABLET ORAL ONCE
Status: COMPLETED | OUTPATIENT
Start: 2024-10-06 | End: 2024-10-06

## 2024-10-06 RX ORDER — BUSPIRONE HYDROCHLORIDE 5 MG/1
10 TABLET ORAL ONCE
Status: COMPLETED | OUTPATIENT
Start: 2024-10-06 | End: 2024-10-06

## 2024-10-06 RX ORDER — LORAZEPAM 1 MG/1
0.5 TABLET ORAL ONCE AS NEEDED
Status: DISCONTINUED | OUTPATIENT
Start: 2024-10-06 | End: 2024-10-07

## 2024-10-06 RX ORDER — LORAZEPAM 1 MG/1
0.5 TABLET ORAL ONCE
Status: DISCONTINUED | OUTPATIENT
Start: 2024-10-06 | End: 2024-10-06

## 2024-10-06 RX ORDER — NITROFURANTOIN 25; 75 MG/1; MG/1
100 CAPSULE ORAL EVERY 12 HOURS
Status: COMPLETED | OUTPATIENT
Start: 2024-10-07 | End: 2024-10-08

## 2024-10-06 RX ADMIN — LEVETIRACETAM 500 MG: 500 TABLET, FILM COATED ORAL at 11:12

## 2024-10-06 RX ADMIN — RISPERIDONE 1 MG: 1 SOLUTION ORAL at 11:11

## 2024-10-06 RX ADMIN — LEVETIRACETAM 500 MG: 500 TABLET, FILM COATED ORAL at 20:46

## 2024-10-06 RX ADMIN — Medication 10 ML: at 20:48

## 2024-10-06 RX ADMIN — CEFDINIR 300 MG: 300 CAPSULE ORAL at 11:12

## 2024-10-06 RX ADMIN — RISPERIDONE 1 MG: 1 SOLUTION ORAL at 20:47

## 2024-10-06 RX ADMIN — ACETAMINOPHEN 650 MG: 325 TABLET ORAL at 11:12

## 2024-10-06 RX ADMIN — OXYCODONE HYDROCHLORIDE AND ACETAMINOPHEN 1 TABLET: 5; 325 TABLET ORAL at 14:59

## 2024-10-06 RX ADMIN — BUSPIRONE HYDROCHLORIDE 10 MG: 5 TABLET ORAL at 20:46

## 2024-10-06 RX ADMIN — Medication 1 CAPSULE: at 20:47

## 2024-10-06 RX ADMIN — NITROFURANTOIN MONOHYDRATE/MACROCRYSTALLINE 100 MG: 25; 75 CAPSULE ORAL at 15:56

## 2024-10-06 RX ADMIN — METRONIDAZOLE 500 MG: 500 TABLET ORAL at 11:12

## 2024-10-06 NOTE — PLAN OF CARE
Goal Outcome Evaluation:  Plan of Care Reviewed With: patient        Progress: no change  Pt received all ordered PO medications as well as most of the IV Venofer before she requested it to be completely stopped. Pt allowed for a few minutes of EFM. VSS, denies pain, and rested throughout the night

## 2024-10-06 NOTE — PROGRESS NOTES
Hospital rounds  Reports itching and rash on venofer last night,   reports some back pain,   Vitals noted  Fht's cat 1  Exam unchanged  A: stable  Plan to treat anemia orally
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
## 2024-10-06 NOTE — NURSING NOTE
Pt was at nursing station with c/o abdominal and back pain refused to let RN assess pt or baby only wanted to speak with the doctor. MD came to desk told the patient we needed to get her on the monitor and she complied
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
## 2024-10-06 NOTE — PROGRESS NOTES
Pt reported suprapubic pain  Denied bleeding or leaking fluid  Vitals noted  Fht's cat 1, no contractions  Cervix long closed  Ua abnormal  A: stable   Plan  one percocet, continue monitoring

## 2024-10-06 NOTE — NURSING NOTE
"Pt refuses medications, vitals, and monitoring at this time, pt states \"I will do everything at 9:00.\"   "
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
## 2024-10-06 NOTE — PLAN OF CARE
Goal Outcome Evaluation:  Plan of Care Reviewed With: patient           Outcome Evaluation: Pt c/o abdominal pain multiple times throughout shift, placed on monitor and pt had reactive strip with no contractions, MD aware, UA sent infection confirmed medications changed. PO iron added, pt currently stable, no new orders pending at this time                               Problem: Adult Inpatient Plan of Care  Goal: Plan of Care Review  Outcome: Unable to Meet  Flowsheets  Taken 10/6/2024 1824 by Cristina Davis RN  Plan of Care Reviewed With: patient  Outcome Evaluation: Pt c/o abdominal pain multiple times throughout shift, placed on monitor and pt had reactive strip with no contractions, MD aware, UA sent infection confirmed medications changed. PO iron added, pt currently stable, no new orders pending at this time  Taken 10/6/2024 0656 by Shala Patrick, RN  Progress: no change  Goal: Patient-Specific Goal (Individualized)  Outcome: Unable to Meet  Flowsheets (Taken 10/5/2024 1810)  Patient-Specific Goals (Include Timeframe): Pt will continue with pregnancy as scheduled without pain this shift  Goal: Absence of Hospital-Acquired Illness or Injury  Outcome: Unable to Meet  Goal: Optimal Comfort and Wellbeing  Outcome: Unable to Meet  Goal: Readiness for Transition of Care  Outcome: Unable to Meet

## 2024-10-06 NOTE — NURSING NOTE
"Took breakfast into pt room as soon as I walked in pt said \"don't talk and get out\" unable to do an assessment, get VS, or discuss a good time to monitor baby. Will try again soon.  "

## 2024-10-07 ENCOUNTER — TELEPHONE (OUTPATIENT)
Dept: PSYCHIATRY | Facility: CLINIC | Age: 19
End: 2024-10-07
Payer: MEDICAID

## 2024-10-07 LAB — BACTERIA SPEC AEROBE CULT: NO GROWTH

## 2024-10-07 PROCEDURE — 25010000002 ONDANSETRON PER 1 MG: Performed by: OBSTETRICS & GYNECOLOGY

## 2024-10-07 RX ORDER — BUSPIRONE HYDROCHLORIDE 5 MG/1
10 TABLET ORAL 2 TIMES DAILY PRN
Status: DISCONTINUED | OUTPATIENT
Start: 2024-10-07 | End: 2024-10-09 | Stop reason: HOSPADM

## 2024-10-07 RX ORDER — CYCLOBENZAPRINE HCL 10 MG
10 TABLET ORAL 3 TIMES DAILY PRN
Status: DISCONTINUED | OUTPATIENT
Start: 2024-10-07 | End: 2024-10-09 | Stop reason: HOSPADM

## 2024-10-07 RX ADMIN — ACETAMINOPHEN 650 MG: 325 TABLET ORAL at 18:04

## 2024-10-07 RX ADMIN — Medication 10 ML: at 20:14

## 2024-10-07 RX ADMIN — BUSPIRONE HYDROCHLORIDE 10 MG: 5 TABLET ORAL at 10:16

## 2024-10-07 RX ADMIN — RISPERIDONE 1 MG: 1 SOLUTION ORAL at 10:16

## 2024-10-07 RX ADMIN — Medication 1 CAPSULE: at 10:18

## 2024-10-07 RX ADMIN — Medication 10 ML: at 10:19

## 2024-10-07 RX ADMIN — ONDANSETRON 4 MG: 2 INJECTION INTRAMUSCULAR; INTRAVENOUS at 18:00

## 2024-10-07 RX ADMIN — NITROFURANTOIN MONOHYDRATE/MACROCRYSTALLINE 100 MG: 25; 75 CAPSULE ORAL at 05:12

## 2024-10-07 RX ADMIN — CYCLOBENZAPRINE HYDROCHLORIDE 10 MG: 10 TABLET, FILM COATED ORAL at 10:17

## 2024-10-07 RX ADMIN — LEVETIRACETAM 500 MG: 500 TABLET, FILM COATED ORAL at 10:17

## 2024-10-07 RX ADMIN — LEVETIRACETAM 500 MG: 500 TABLET, FILM COATED ORAL at 20:13

## 2024-10-07 RX ADMIN — Medication 1 CAPSULE: at 20:14

## 2024-10-07 RX ADMIN — BUSPIRONE HYDROCHLORIDE 10 MG: 5 TABLET ORAL at 20:13

## 2024-10-07 RX ADMIN — RISPERIDONE 1 MG: 1 SOLUTION ORAL at 20:14

## 2024-10-07 RX ADMIN — NITROFURANTOIN MONOHYDRATE/MACROCRYSTALLINE 100 MG: 25; 75 CAPSULE ORAL at 18:38

## 2024-10-07 RX ADMIN — CYCLOBENZAPRINE HYDROCHLORIDE 10 MG: 10 TABLET, FILM COATED ORAL at 20:14
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
## 2024-10-07 NOTE — PLAN OF CARE
Goal Outcome Evaluation:              Outcome Evaluation: Patient with no complaints of abdominal pain or discomfort, denies contractions, abdomen is soft and non tender.  FHT reassuring, patient agreeable with care at this point, IID, will continue to monitor.

## 2024-10-07 NOTE — NURSING NOTE
Pt and significant other are both sleeping at this time.  Pt previously demanded to not be woken up for any reason if we enter the room and she is asleep.

## 2024-10-07 NOTE — TELEPHONE ENCOUNTER
On Thursday Oct 3rd at 9:56 Supervisor was contacted by [facility] case management regarding a repeat consult for patient. Supervisor reached out to Dr Maier who asked that the  or the nurse for the patient reach out to him via telephone to discuss the reason for the repeat consult.  was given Dr Maier's cellphone number.

## 2024-10-07 NOTE — PLAN OF CARE
Goal Outcome Evaluation:      Pt has done well today.  Slept most of the day.  Had c/o back pain this a.m. and was provided an Aqua K pad and Flexeril PRN.  When patient has been awake she has been cooperative with her care.  Presently up to shower. No c/o leaking, bleeding, ctx's and has reported good fetal movement.

## 2024-10-07 NOTE — NURSING NOTE
Accompanied Dr. Padilla to room to evaluate pt.  Pt c/o low back pain.  Offered a muscle relaxer for the discomfort.  Pt then refused all morning meds and refused monitoring at this time.  Pt is agreeable to take morning meds when muscle relaxer becomes available.

## 2024-10-07 NOTE — NURSING NOTE
Aqua K pad provided to pt for back pain.  Pt agreeable to taking morning meds, BP, and IID flush.  Pt agrees to fetal monitoring after lunch.

## 2024-10-08 PROCEDURE — 63710000001 ONDANSETRON ODT 4 MG TABLET DISPERSIBLE: Performed by: OBSTETRICS & GYNECOLOGY

## 2024-10-08 RX ADMIN — LEVETIRACETAM 500 MG: 500 TABLET, FILM COATED ORAL at 19:38

## 2024-10-08 RX ADMIN — NITROFURANTOIN MONOHYDRATE/MACROCRYSTALLINE 100 MG: 25; 75 CAPSULE ORAL at 11:42

## 2024-10-08 RX ADMIN — CYCLOBENZAPRINE HYDROCHLORIDE 10 MG: 10 TABLET, FILM COATED ORAL at 19:38

## 2024-10-08 RX ADMIN — BUSPIRONE HYDROCHLORIDE 10 MG: 5 TABLET ORAL at 19:37

## 2024-10-08 RX ADMIN — RISPERIDONE 1 MG: 1 SOLUTION ORAL at 11:42

## 2024-10-08 RX ADMIN — BUSPIRONE HYDROCHLORIDE 10 MG: 5 TABLET ORAL at 11:49

## 2024-10-08 RX ADMIN — ONDANSETRON 8 MG: 4 TABLET, ORALLY DISINTEGRATING ORAL at 11:49

## 2024-10-08 RX ADMIN — Medication 1 CAPSULE: at 11:42

## 2024-10-08 RX ADMIN — RISPERIDONE 1 MG: 1 SOLUTION ORAL at 19:37

## 2024-10-08 RX ADMIN — DOCUSATE SODIUM 100 MG: 100 CAPSULE, LIQUID FILLED ORAL at 11:42

## 2024-10-08 RX ADMIN — CYCLOBENZAPRINE HYDROCHLORIDE 10 MG: 10 TABLET, FILM COATED ORAL at 11:49

## 2024-10-08 RX ADMIN — LEVETIRACETAM 500 MG: 500 TABLET, FILM COATED ORAL at 11:42

## 2024-10-08 RX ADMIN — Medication 10 ML: at 19:41

## 2024-10-08 RX ADMIN — Medication 1 CAPSULE: at 19:38

## 2024-10-08 RX ADMIN — Medication 10 ML: at 11:43

## 2024-10-08 RX ADMIN — DOCUSATE SODIUM 100 MG: 100 CAPSULE, LIQUID FILLED ORAL at 19:38
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
## 2024-10-08 NOTE — NURSING NOTE
Pt awake and agreeable to monitoring and taking meds.  Vital signs obtained.  Pt afebrile.  Pt denies leaking of fluid, vaginal bleeding, and contractions.  Pt does c/o back pain/spasms.  Medications provided for that complaint.  Pt states active fetal movements felt.

## 2024-10-09 ENCOUNTER — HOSPITAL ENCOUNTER (INPATIENT)
Facility: HOSPITAL | Age: 19
LOS: 1 days | Discharge: LEFT BEFORE DISCHARGE | End: 2024-10-10
Attending: OBSTETRICS & GYNECOLOGY | Admitting: OBSTETRICS & GYNECOLOGY
Payer: MEDICAID

## 2024-10-09 VITALS
DIASTOLIC BLOOD PRESSURE: 58 MMHG | HEART RATE: 116 BPM | TEMPERATURE: 98 F | RESPIRATION RATE: 18 BRPM | SYSTOLIC BLOOD PRESSURE: 111 MMHG | OXYGEN SATURATION: 99 %

## 2024-10-09 LAB
CANNABINOIDS UR QL CFM: NORMAL
CARBOXYTHC/CREAT UR: 92 NG/MG CREAT
LEVEL OF DETECTION:: NORMAL

## 2024-10-09 RX ORDER — DOCUSATE SODIUM 100 MG/1
100 CAPSULE, LIQUID FILLED ORAL 2 TIMES DAILY
Status: DISCONTINUED | OUTPATIENT
Start: 2024-10-10 | End: 2024-10-10 | Stop reason: HOSPADM

## 2024-10-09 RX ORDER — SODIUM CHLORIDE 0.9 % (FLUSH) 0.9 %
10 SYRINGE (ML) INJECTION EVERY 12 HOURS SCHEDULED
Status: DISCONTINUED | OUTPATIENT
Start: 2024-10-10 | End: 2024-10-10 | Stop reason: HOSPADM

## 2024-10-09 RX ORDER — ACETAMINOPHEN 325 MG/1
650 TABLET ORAL EVERY 6 HOURS PRN
Status: DISCONTINUED | OUTPATIENT
Start: 2024-10-09 | End: 2024-10-10 | Stop reason: HOSPADM

## 2024-10-09 RX ORDER — ONDANSETRON 2 MG/ML
4 INJECTION INTRAMUSCULAR; INTRAVENOUS EVERY 6 HOURS PRN
Status: DISCONTINUED | OUTPATIENT
Start: 2024-10-09 | End: 2024-10-10 | Stop reason: HOSPADM

## 2024-10-09 RX ORDER — POLYETHYLENE GLYCOL 3350 17 G/17G
17 POWDER, FOR SOLUTION ORAL ONCE
Status: COMPLETED | OUTPATIENT
Start: 2024-10-09 | End: 2024-10-09

## 2024-10-09 RX ORDER — POLYETHYLENE GLYCOL 3350 17 G/17G
17 POWDER, FOR SOLUTION ORAL DAILY
Status: DISCONTINUED | OUTPATIENT
Start: 2024-10-10 | End: 2024-10-10 | Stop reason: HOSPADM

## 2024-10-09 RX ORDER — LEVETIRACETAM 500 MG/1
500 TABLET ORAL 2 TIMES DAILY
Status: DISCONTINUED | OUTPATIENT
Start: 2024-10-10 | End: 2024-10-10 | Stop reason: HOSPADM

## 2024-10-09 RX ORDER — RISPERIDONE 1 MG/ML
1 SOLUTION ORAL EVERY 12 HOURS SCHEDULED
Status: DISCONTINUED | OUTPATIENT
Start: 2024-10-10 | End: 2024-10-10 | Stop reason: HOSPADM

## 2024-10-09 RX ORDER — SODIUM CHLORIDE 0.9 % (FLUSH) 0.9 %
10 SYRINGE (ML) INJECTION AS NEEDED
Status: DISCONTINUED | OUTPATIENT
Start: 2024-10-09 | End: 2024-10-10 | Stop reason: HOSPADM

## 2024-10-09 RX ORDER — CYCLOBENZAPRINE HCL 10 MG
10 TABLET ORAL 3 TIMES DAILY PRN
Status: DISCONTINUED | OUTPATIENT
Start: 2024-10-09 | End: 2024-10-10 | Stop reason: HOSPADM

## 2024-10-09 RX ORDER — BUSPIRONE HYDROCHLORIDE 5 MG/1
10 TABLET ORAL 3 TIMES DAILY PRN
Status: DISCONTINUED | OUTPATIENT
Start: 2024-10-09 | End: 2024-10-10 | Stop reason: HOSPADM

## 2024-10-09 RX ADMIN — RISPERIDONE 1 MG: 1 SOLUTION ORAL at 12:32

## 2024-10-09 RX ADMIN — POLYETHYLENE GLYCOL 3350 17 G: 17 POWDER, FOR SOLUTION ORAL at 15:05

## 2024-10-09 RX ADMIN — BUSPIRONE HYDROCHLORIDE 10 MG: 5 TABLET ORAL at 15:05

## 2024-10-09 RX ADMIN — CYCLOBENZAPRINE HYDROCHLORIDE 10 MG: 10 TABLET, FILM COATED ORAL at 15:05

## 2024-10-09 RX ADMIN — LEVETIRACETAM 500 MG: 500 TABLET, FILM COATED ORAL at 12:32

## 2024-10-09 RX ADMIN — Medication 1 CAPSULE: at 12:32

## 2024-10-09 RX ADMIN — Medication 10 ML: at 12:31

## 2024-10-09 NOTE — PROGRESS NOTES
Admission day 10  3034d gestation, previous c/s with incisional defect,  anemia, hx of seizures   Vitals noted  Exam unremarkable  Fht's cat 1  A: stable   Plan continue care as ordered

## 2024-10-09 NOTE — PLAN OF CARE
Goal Outcome Evaluation:  Plan of Care Reviewed With: patient           Outcome Evaluation: Patient agreeable to fetal monitoring, vital sign monitoring, and medication administration at start of shift. Flexeril and buspar given per patient request. No complaints from patient for the remainder of the shift. Regular diet at this time. FOB in room. Room cleaned/organized per patient request. Patient refused any further monitoring, but denies contractions or abdominal pain. Fetal movement reported per patient.
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
## 2024-10-09 NOTE — NURSING NOTE
Pt refuses all morning medications, assessment, vital signs, and monitoring at this time. Breakfast tray left at pts bedside.

## 2024-10-10 ENCOUNTER — HOSPITAL ENCOUNTER (OUTPATIENT)
Facility: HOSPITAL | Age: 19
Discharge: LEFT BEFORE DISCHARGE | End: 2024-10-10
Attending: OBSTETRICS & GYNECOLOGY | Admitting: OBSTETRICS & GYNECOLOGY
Payer: MEDICAID

## 2024-10-10 ENCOUNTER — HOSPITAL ENCOUNTER (OUTPATIENT)
Age: 19
Discharge: HOME | End: 2024-10-10
Attending: OBSTETRICS & GYNECOLOGY | Admitting: OBSTETRICS & GYNECOLOGY

## 2024-10-10 VITALS
TEMPERATURE: 98.3 F | DIASTOLIC BLOOD PRESSURE: 67 MMHG | SYSTOLIC BLOOD PRESSURE: 138 MMHG | OXYGEN SATURATION: 98 % | RESPIRATION RATE: 18 BRPM | HEART RATE: 115 BPM

## 2024-10-10 VITALS
TEMPERATURE: 97.1 F | RESPIRATION RATE: 20 BRPM | SYSTOLIC BLOOD PRESSURE: 131 MMHG | WEIGHT: 182 LBS | DIASTOLIC BLOOD PRESSURE: 73 MMHG | HEIGHT: 66 IN | HEART RATE: 109 BPM | BODY MASS INDEX: 29.25 KG/M2 | OXYGEN SATURATION: 98 %

## 2024-10-10 VITALS
OXYGEN SATURATION: 96 % | RESPIRATION RATE: 18 BRPM | SYSTOLIC BLOOD PRESSURE: 123 MMHG | HEART RATE: 111 BPM | DIASTOLIC BLOOD PRESSURE: 88 MMHG | TEMPERATURE: 97.7 F

## 2024-10-10 PROBLEM — Z3A.30 30 WEEKS GESTATION OF PREGNANCY: Status: ACTIVE | Noted: 2024-10-10

## 2024-10-10 PROBLEM — Z3A.31 31 WEEKS GESTATION OF PREGNANCY: Status: ACTIVE | Noted: 2024-10-10

## 2024-10-10 LAB
ABO GROUP BLD: NORMAL
AMNISURE, POC: NEGATIVE
BACTERIA URNS QL MICRO: ABNORMAL /HPF
BASOPHILS # BLD AUTO: 0.06 10*3/MM3 (ref 0–0.2)
BASOPHILS NFR BLD AUTO: 0.5 % (ref 0–1.5)
BILIRUB UR QL STRIP: NEGATIVE
BLD GP AB SCN SERPL QL: NEGATIVE
CLARITY UR: CLEAR
COLOR UR: YELLOW
CRYSTALS URNS MICRO: ABNORMAL /HPF
DEPRECATED RDW RBC AUTO: 43.7 FL (ref 37–54)
EOSINOPHIL # BLD AUTO: 0.17 10*3/MM3 (ref 0–0.4)
EOSINOPHIL NFR BLD AUTO: 1.3 % (ref 0.3–6.2)
EPI CELLS #/AREA URNS AUTO: 9 /HPF (ref 0–5)
ERYTHROCYTE [DISTWIDTH] IN BLOOD BY AUTOMATED COUNT: 14.1 % (ref 12.3–15.4)
GLUCOSE UR STRIP.AUTO-MCNC: NEGATIVE MG/DL
HCT VFR BLD AUTO: 28.7 % (ref 34–46.6)
HGB BLD-MCNC: 8.9 G/DL (ref 12–15.9)
HGB UR STRIP.AUTO-MCNC: ABNORMAL MG/L
HYALINE CASTS #/AREA URNS AUTO: 1 /HPF (ref 0–8)
IMM GRANULOCYTES # BLD AUTO: 0.26 10*3/MM3 (ref 0–0.05)
IMM GRANULOCYTES NFR BLD AUTO: 2 % (ref 0–0.5)
KETONES UR STRIP.AUTO-MCNC: NEGATIVE MG/DL
LEUKOCYTE ESTERASE UR QL STRIP.AUTO: ABNORMAL
LYMPHOCYTES # BLD AUTO: 2.43 10*3/MM3 (ref 0.7–3.1)
LYMPHOCYTES NFR BLD AUTO: 18.3 % (ref 19.6–45.3)
Lab: NORMAL
MCH RBC QN AUTO: 26.6 PG (ref 26.6–33)
MCHC RBC AUTO-ENTMCNC: 31 G/DL (ref 31.5–35.7)
MCV RBC AUTO: 85.9 FL (ref 79–97)
MONOCYTES # BLD AUTO: 0.83 10*3/MM3 (ref 0.1–0.9)
MONOCYTES NFR BLD AUTO: 6.2 % (ref 5–12)
NEGATIVE QC PASS/FAIL: NORMAL
NEUTROPHILS NFR BLD AUTO: 71.7 % (ref 42.7–76)
NEUTROPHILS NFR BLD AUTO: 9.54 10*3/MM3 (ref 1.7–7)
NITRITE UR QL STRIP.AUTO: NEGATIVE
NRBC BLD AUTO-RTO: 0 /100 WBC (ref 0–0.2)
PH UR STRIP.AUTO: 6.5 [PH] (ref 5–8)
PLATELET # BLD AUTO: 260 10*3/MM3 (ref 140–450)
PMV BLD AUTO: 11.6 FL (ref 6–12)
POSITIVE QC PASS/FAIL: NORMAL
PROT UR STRIP.AUTO-MCNC: NEGATIVE MG/DL
RBC # BLD AUTO: 3.34 10*6/MM3 (ref 3.77–5.28)
RBC #/AREA URNS AUTO: 7 /HPF (ref 0–4)
RH BLD: POSITIVE
SP GR UR STRIP.AUTO: 1.02 (ref 1–1.03)
T&S EXPIRATION DATE: NORMAL
TREPONEMA PALLIDUM IGG+IGM AB [PRESENCE] IN SERUM OR PLASMA BY IMMUNOASSAY: NORMAL
UROBILINOGEN UR STRIP.AUTO-MCNC: 0.2 E.U./DL
WBC #/AREA URNS AUTO: 7 /HPF (ref 0–5)
WBC NRBC COR # BLD AUTO: 13.29 10*3/MM3 (ref 3.4–10.8)

## 2024-10-10 PROCEDURE — 99213 OFFICE O/P EST LOW 20 MIN: CPT

## 2024-10-10 PROCEDURE — 86850 RBC ANTIBODY SCREEN: CPT | Performed by: OBSTETRICS & GYNECOLOGY

## 2024-10-10 PROCEDURE — 85025 COMPLETE CBC W/AUTO DIFF WBC: CPT | Performed by: OBSTETRICS & GYNECOLOGY

## 2024-10-10 PROCEDURE — 96367 TX/PROPH/DG ADDL SEQ IV INF: CPT

## 2024-10-10 PROCEDURE — 25810000003 LACTATED RINGERS SOLUTION: Performed by: OBSTETRICS & GYNECOLOGY

## 2024-10-10 PROCEDURE — G0463 HOSPITAL OUTPT CLINIC VISIT: HCPCS

## 2024-10-10 PROCEDURE — 36415 COLL VENOUS BLD VENIPUNCTURE: CPT | Performed by: OBSTETRICS & GYNECOLOGY

## 2024-10-10 PROCEDURE — 86901 BLOOD TYPING SEROLOGIC RH(D): CPT | Performed by: OBSTETRICS & GYNECOLOGY

## 2024-10-10 PROCEDURE — 84112 EVAL AMNIOTIC FLUID PROTEIN: CPT

## 2024-10-10 PROCEDURE — 86900 BLOOD TYPING SEROLOGIC ABO: CPT | Performed by: OBSTETRICS & GYNECOLOGY

## 2024-10-10 PROCEDURE — 81001 URINALYSIS AUTO W/SCOPE: CPT

## 2024-10-10 PROCEDURE — 25010000002 CEFTRIAXONE PER 250 MG: Performed by: OBSTETRICS & GYNECOLOGY

## 2024-10-10 PROCEDURE — G0378 HOSPITAL OBSERVATION PER HR: HCPCS

## 2024-10-10 PROCEDURE — 86780 TREPONEMA PALLIDUM: CPT | Performed by: OBSTETRICS & GYNECOLOGY

## 2024-10-10 PROCEDURE — 25010000002 IRON SUCROSE PER 1 MG: Performed by: OBSTETRICS & GYNECOLOGY

## 2024-10-10 PROCEDURE — 25810000003 SODIUM CHLORIDE 0.9 % SOLUTION 250 ML FLEX CONT: Performed by: OBSTETRICS & GYNECOLOGY

## 2024-10-10 PROCEDURE — 96366 THER/PROPH/DIAG IV INF ADDON: CPT

## 2024-10-10 PROCEDURE — 96365 THER/PROPH/DIAG IV INF INIT: CPT

## 2024-10-10 RX ORDER — SODIUM CHLORIDE 0.9 % (FLUSH) 0.9 %
10 SYRINGE (ML) INJECTION AS NEEDED
Status: DISCONTINUED | OUTPATIENT
Start: 2024-10-10 | End: 2024-10-10 | Stop reason: HOSPADM

## 2024-10-10 RX ORDER — ACETAMINOPHEN 325 MG/1
650 TABLET ORAL EVERY 8 HOURS PRN
Status: DISCONTINUED | OUTPATIENT
Start: 2024-10-10 | End: 2024-10-10 | Stop reason: HOSPADM

## 2024-10-10 RX ORDER — SODIUM CHLORIDE, SODIUM LACTATE, POTASSIUM CHLORIDE, CALCIUM CHLORIDE 600; 310; 30; 20 MG/100ML; MG/100ML; MG/100ML; MG/100ML
125 INJECTION, SOLUTION INTRAVENOUS CONTINUOUS
Status: DISPENSED | OUTPATIENT
Start: 2024-10-10 | End: 2024-10-10

## 2024-10-10 RX ORDER — RISPERIDONE 1 MG/ML
1 SOLUTION ORAL EVERY 12 HOURS SCHEDULED
Status: DISCONTINUED | OUTPATIENT
Start: 2024-10-10 | End: 2024-10-10 | Stop reason: HOSPADM

## 2024-10-10 RX ORDER — LEVETIRACETAM 500 MG/1
500 TABLET ORAL EVERY 12 HOURS SCHEDULED
Status: DISCONTINUED | OUTPATIENT
Start: 2024-10-10 | End: 2024-10-10 | Stop reason: HOSPADM

## 2024-10-10 RX ORDER — SODIUM CHLORIDE 0.9 % (FLUSH) 0.9 %
10 SYRINGE (ML) INJECTION EVERY 12 HOURS SCHEDULED
Status: DISCONTINUED | OUTPATIENT
Start: 2024-10-10 | End: 2024-10-10 | Stop reason: HOSPADM

## 2024-10-10 RX ADMIN — DOCUSATE SODIUM 100 MG: 100 CAPSULE, LIQUID FILLED ORAL at 01:12

## 2024-10-10 RX ADMIN — RISPERIDONE 1 MG: 1 SOLUTION ORAL at 01:12

## 2024-10-10 RX ADMIN — CYCLOBENZAPRINE HYDROCHLORIDE 10 MG: 10 TABLET, FILM COATED ORAL at 01:12

## 2024-10-10 RX ADMIN — BUSPIRONE HYDROCHLORIDE 10 MG: 5 TABLET ORAL at 01:12

## 2024-10-10 RX ADMIN — SODIUM CHLORIDE 1000 MG: 900 INJECTION INTRAVENOUS at 18:00

## 2024-10-10 RX ADMIN — LEVETIRACETAM 500 MG: 500 TABLET, FILM COATED ORAL at 20:20

## 2024-10-10 RX ADMIN — RISPERIDONE 1 MG: 1 SOLUTION ORAL at 20:20

## 2024-10-10 RX ADMIN — Medication 1 CAPSULE: at 01:12

## 2024-10-10 RX ADMIN — SODIUM CHLORIDE, POTASSIUM CHLORIDE, SODIUM LACTATE AND CALCIUM CHLORIDE 1000 ML: 600; 310; 30; 20 INJECTION, SOLUTION INTRAVENOUS at 00:09

## 2024-10-10 RX ADMIN — IRON SUCROSE 400 MG: 20 INJECTION, SOLUTION INTRAVENOUS at 19:11

## 2024-10-10 RX ADMIN — LEVETIRACETAM 500 MG: 500 TABLET, FILM COATED ORAL at 01:12

## 2024-10-10 ASSESSMENT — ENCOUNTER SYMPTOMS
ABDOMINAL PAIN: 1
RESPIRATORY NEGATIVE: 1
CONSTIPATION: 1
BACK PAIN: 1
NAUSEA: 1

## 2024-10-10 NOTE — DISCHARGE INSTRUCTIONS
LABOR AND DELIVERY - OBSERVATION DISCHARGE INSTRUCTIONS    TERM LABOR: RETURN TO HOSPITAL IF:  __There is a leaking or sudden gush of fluid from the vagina (note color, odor, time and amount of fluid)    __ You have bright red vaginal bleeding    __You begin to have regular contractions, occuring every 4-5 minutes, each contraction lasting 45-60 seconds for one hour. Time contractions from beginning of one to the beginning of the next. True contractions have a regular rate and become progressively closer. They are not changed by position change and are usually more uncomfortable when walking.    PRE-TERM LABOR  __Your gestational age is 30 weeks: term pregnancy starts at 37 weeks.  __Fill your prescription and take as directed.  __Bed rest (left lying position is best).  __Refrain from sexual intercourse until you see your physician again.  __No heavy lifting or strenuous activity.    RETURN TO HOSPITAL IF:  __Contractions occur 3-4 in any hour (every 10-15 minutes), maybe with or without pain.  __Rhythmic or constant menstrual-like cramps  __Rhythmic or constant lower, dull backache may radiate to the sides or front. Increase or change in vaginal discharge, may be watery, pink, red or brown-tinged.        OTHER INSTRUCTIONS  __Make an appointment to see your attending physician  __After today's visit, you will need to return to registration and pre register for your next visit     NOTE: If you do not begin to feel better, or you have any questions, contact your physician or call [phone], or return to the hospital.
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
## 2024-10-10 NOTE — H&P
[facility]   Dani Trinidad  : 2005  MRN: [id number]  CSN: [id number]    History and Physical    Subjective   Dani Trinidad is a 19 y.o.  30w4d,  returned to be monitored, reports some cramping, reports good mov,  denies any bleeding or severe pain. Contractions noted, pt has agreed to iv fluids and monitoring.    Past Medical History:   Diagnosis Date    Allergic     Allergic rhinitis     Asthma     Attention deficit disorder     Bipolar 1 disorder     Chlamydia     Depression     Fetal alcohol spectrum disorder     Fetal alcohol syndrome     Gestational diabetes     Herpes     Insomnia     Nocturnal enuresis     Seizure      Past Surgical History:   Procedure Laterality Date     SECTION N/A 7/3/2022    Procedure:  SECTION PRIMARY;  Surgeon: Jolene Bellamy DO;  Location: [facility] LABOR DELIVERY;  Service: Obstetrics;  Laterality: N/A;     SHUNT INSERTION       SHUNT REMOVAL       Social History    Tobacco Use      Smoking status: Every Day        Packs/day: 0.25        Years: 0.3 packs/day for 5.0 years (1.3 ttl pk-yrs)        Types: Cigarettes        Passive exposure: Current      Smokeless tobacco: Never      Current Facility-Administered Medications:     acetaminophen (TYLENOL) tablet 650 mg, 650 mg, Oral, Q6H PRN, Fab Chavarria MD    busPIRone (BUSPAR) tablet 10 mg, 10 mg, Oral, TID PRN, Fab Chavarria MD, 10 mg at 10/10/24 011    cyclobenzaprine (FLEXERIL) tablet 10 mg, 10 mg, Oral, TID PRN, Fab Chavarria MD, 10 mg at 10/10/24 011    docusate sodium (COLACE) capsule 100 mg, 100 mg, Oral, BID, Fab Chavarria MD, 100 mg at 10/10/24 011    iron polysacch complex-B12-FA (FERREX 150 FORTE) capsule 1 capsule, 1 capsule, Oral, Q12H, Fab Chavarria MD, 1 capsule at 10/10/24 011    levETIRAcetam (KEPPRA) tablet 500 mg, 500 mg, Oral, BID, Fab Chavarria MD, 500 mg at 10/10/24 011    magnesium hydroxide (MILK OF MAGNESIA) suspension 10 mL, 10 mL,  "Oral, Daily PRN, Fab Chavarria MD    ondansetron (ZOFRAN) injection 4 mg, 4 mg, Intravenous, Q6H PRN, Fab Chavarria MD    polyethylene glycol (MIRALAX) packet 17 g, 17 g, Oral, Daily, Fab Chavarria MD    risperiDONE (risperDAL) 1 MG/ML oral solution 1 mg, 1 mg, Oral, Q12H, Fab Chavarria MD, 1 mg at 10/10/24 0112    sodium chloride 0.9 % flush 10 mL, 10 mL, Intravenous, Q12H, Fab Chavarria MD    sodium chloride 0.9 % flush 10 mL, 10 mL, Intravenous, PRN, Fab Chavarria MD    Allergies   Allergen Reactions    Penicillins Anaphylaxis    Shellfish-Derived Products Anaphylaxis    Peanut-Containing Drug Products Unknown (See Comments)     positive on past allergy testing (paper scanned)    Shellfish-Derived Products Unknown (See Comments)     + on past allergy testing per Foster mother.     Penicillins Unknown (See Comments)     Positive on allergy testing       Review of Systems      Objective   /73 (BP Location: Right arm, Patient Position: Lying)   Pulse 109   Temp 97.1 °F (36.2 °C) (Oral)   Resp 20   Ht 167.6 cm (66\")   Wt 82.6 kg (182 lb)   SpO2 98%   BMI 29.38 kg/m²   General: well developed; well nourished  no acute distress  mentation appropriate   Heart: regular rate and rhythm, S1, S2 normal, no murmur, click, rub or gallop   Lungs: breathing is unlabored   Abdomen: soft, non-tender; no masses  no umbilical or inguinal hernias are present  no hepato-splenomegaly   Pelvis:: Not performed.    Fht's cat 1, some irregular mild contractions noted. Audible fetal mov, difficulty monitoring due to fetal mov, and poor pt cooperation. The last 30 min were cat 1, no decels   Labs  Lab Results   Component Value Date     10/10/2024    HGB 8.9 (L) 10/10/2024    HCT 28.7 (L) 10/10/2024    WBC 13.29 (H) 10/10/2024     09/29/2024    K 3.5 09/29/2024     09/29/2024    CO2 25.0 09/29/2024    BUN 9 09/29/2024    CREATININE 0.39 (L) 09/29/2024    GLUCOSE 60 (L) 09/29/2024 "    ALBUMIN 3.7 09/29/2024    CALCIUM 9.0 09/29/2024    AST 15 09/29/2024    ALT 9 09/29/2024    BILITOT <0.2 09/29/2024        Assessment   30w5d gestation  Uterine wall  window  Seizure disorder   Hydrocephalus s/p shunt  Anemia  Possible uti    The risks, benefits, and alternatives of the procedure; along with the risks of anesthesia was discussed in full with the patient and family and all questions were answered.       Plan   Readmit to labor and delivery and restart previous orders. Plan to deliver by 32 weeks    Fab Chavarria MD  10/10/2024  09:55 CDT

## 2024-10-10 NOTE — FLOWSHEET NOTE
Dr. Sahu in the department at this time. This RN spoke to Dr. Sahu regarding deceleration on fetal monitoring strip and UA results. Per Dr. Sahu orders were given for a urine culture.

## 2024-10-10 NOTE — FLOWSHEET NOTE
This RN called and spoke to Dr. Sahu regarding pt arrival. Per Dr. Sahu if fetal monitoring strip is reactive pt can be discharged if she gets a cab voucher to [facility]. Pt states that she would like to go back to [facility] at this time.

## 2024-10-10 NOTE — CARE COORDINATION
Consult: ROCK Rossi provided Pt with a cab voucher per Pt request.  Miles 2.5 miles [address] [place] [address]. [place] [address].   $10.00 minimum rate. Total $10.00.  Electronically signed by Enid Coulter on 10/10/2024 at 1:45 PM

## 2024-10-10 NOTE — FLOWSHEET NOTE
This RN and Dr. Sahu at the bedside at this time. Dr. Sahu discussed transfer options with the pt and discussed options with the pt that she can sign out AMA but Dr. Sahu advised pt that she needed to have fetal monitoring. Pt stated that she would go back to [facility].

## 2024-10-10 NOTE — FLOWSHEET NOTE
This RN called and spoke to Dr. Sahu regarding pt tempature and decelerations. Dr. Sahu en route.

## 2024-10-10 NOTE — FLOWSHEET NOTE
This RN called and spoke to Dr. Sahu regarding pt arrival. Per Dr. Sahu orders were given to send a UA and to keep the pt on the monitor for one hour. Per Dr. Sahu if no fetal decelerations were noted on the fetal monitoring pt can be taken off the monitor.
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
## 2024-10-10 NOTE — FLOWSHEET NOTE
This RN at the bedside. Pt requesting to get up to use the restroom. This RN explained to the pt that she could go use the restroom and then be plugged back into the fetal monitoring system. Pt stated that the monitors made it hard to use the bathroom. This RN educated the pt that she could take the monitors off to use the restroom but the monitors would need to be reapplied once she finished using the restroom. Pt reported to this RN that she wanted to go outside to smoke. This RN educated the pt on Dr. Sahu wanting pt to stay on the fetal monitor and did not want the pt going outside to smoke. Pt took off the fetal monitors and raised her voice at this nurse. Pt stated to this RN that she will go outside to smoke. Pt reported to the nurse that the staff cannot keep her here. This RN educated pt on the AMA forms and pt reported that she would not sign them but will be going outside to smoke. This RN had DAY Payton RN, Manager SARTHAK Kirby, RN, and Dr. Sahu come to the bedside. Manager SARTHAK Kirby RN explained to pt that we could arrange daily monitoring for the pt. Pt stated that she would like to go to [facility] because it is closer to her home. Dr. Sahu explained the risks to the pt about coming off the monitor and going outside to smoke. Pt stated that she has stress induced seizures and smoking was calming her stress. Dr. Sahu advised pt that she could sign the AMA forms if she wanted to go smoke and explained the risks to the pt of leaving or stay and continue to be monitored. Pt signed AMA forms at this time. Pt ambulated off L&D with a strong steady gait at this time.

## 2024-10-10 NOTE — FLOWSHEET NOTE
This RN at the bedside. Pt educated on Labor precaution and discharge instructions. Pt educated that Sangeetha  will walk pt to her cab. Pt educated that cab was transporting her to [facility] per Pt request.

## 2024-10-10 NOTE — FLOWSHEET NOTE
This RN and Dr. Sahu at the bedside at this time. Dr. Sahu discussed plan of care discussed with pt. Dr. Sahu offered to transport pt to [place]. Pt refused transfer to [place]. Pt reported to Dr. Sahu that she wants to go outside and smoke. Dr. Sahu advised pt that she needs to be on the fetal monitoring and he could give her a nicotine patch. Pt refused a nicotine patch.

## 2024-10-10 NOTE — PLAN OF CARE
Goal Outcome Evaluation:  Plan of Care Reviewed With: patient        Progress: no change  Outcome Evaluation: Patient arrived back to unit at 2318 on 10/09. Patient having contractions when initially placed on the monitor. Fluid bolus given. Patient no longer complaining of pain. She has been sleeping. Refusing fetal monitoring/vital sign checks at this time. FOB in room.

## 2024-10-10 NOTE — NURSING NOTE
Patient stated she was discharged from [facility] and they bought her a cab to come to [facility].    Gelacio Callahan RN

## 2024-10-10 NOTE — H&P
HISTORY AND PHYSICAL             Date: 10/10/2024        Patient Name: Shukri Topete       YOB: 2005      Age:  19 y.o.  MRN: [id number]      Chief Complaint     Chief Complaint   Patient presents with    Vaginal Bleeding    Contractions      Observation      History Obtained From   patient    Shukri Topete is a 19 y.o. female ,  @ 30 5/7 weeks by SENTHIL who was observed on L&D after signing out AMA from [facility], and presents c/o ctxs and vaginal bleeding. Pt admits to +.  No LMP recorded. Patient is pregnant. with Estimated Date of Delivery: None noted..        Past Medical History     Past Medical History:   Diagnosis Date    ADHD (attention deficit hyperactivity disorder)       OB History    Para Term  AB Living   1 0 0 0 0 0   SAB IAB Ectopic Molar Multiple Live Births   0 0 0 0 0 0      # Outcome Date GA Lbr Torito/2nd Weight Sex Type Anes PTL Lv   1 Current                 Past Surgical History   No past surgical history on file.     Medications Prior to Admission     Prior to Admission medications    Medication Sig Start Date End Date Taking? Authorizing Provider   ondansetron (ZOFRAN-ODT) 4 MG disintegrating tablet Take 1 tablet by mouth 3 times daily as needed for Nausea or Vomiting  Patient not taking: Reported on 10/10/2024 6/9/23   Alicia Tello, APRN - CNP   amphetamine-dextroamphetamine (ADDERALL XR) 15 MG extended release capsule Take 15 mg by mouth every morning  Patient not taking: Reported on 10/10/2024    Ashley Garcia MD   melatonin 3 MG TABS tablet Take 3 mg by mouth daily  Patient not taking: Reported on 10/10/2024    Ashley Garcia MD   Budesonide-Formoterol Fumarate (SYMBICORT IN) Inhale into the lungs  Patient not taking: Reported on 10/10/2024    Ashley Garcia MD   Lisdexamfetamine Dimesylate (VYVANSE PO) Take by mouth    Ashley Garcia MD   LamoTRIgine (LAMICTAL PO) Take by mouth  Patient not taking: Reported on 10/10/2024     Ashley Garcia MD   GUAIFENESIN PO Take by mouth  Patient not taking: Reported on 10/10/2024    Ashley Garcia MD        Allergies   Penicillins and Shellfish-derived products    Social History     Social History       Tobacco History       Smoking Status  Never      Smokeless Tobacco Use  Unknown              Alcohol History       Alcohol Use Status  No              Drug Use       Drug Use Status  No              Sexual Activity       Sexually Active  Never                    Family History   No family history on file.    Review of Systems   Review of Systems   Constitutional: Negative.    Respiratory: Negative.     Cardiovascular: Negative.    Gastrointestinal:  Positive for abdominal pain, constipation and nausea.   Genitourinary:  Positive for vaginal bleeding.        Ctxs   Musculoskeletal:  Positive for back pain.   Neurological: Negative.    Psychiatric/Behavioral: Negative.     All other systems reviewed and are negative.      Physical Exam   /77   Pulse 100   Temp 97.3 °F (36.3 °C) (Temporal)   Resp 18   SpO2 96%     Constitutional: NAD, normal appearance, not ill-appearing  HENT: NC/AT  Neck: supple  Lungs: CTA bilaterally  Cardio: [id number] no M/G/R  Abd: soft, NT  Extremities: no C/C/E  Neurological: No focal deficits. Mental Status A&Ox3  Skin: warm and dry  Psychiatric: mood, affect and behavior normal      Labs      Recent Results (from the past 24 hour(s))   POC AmniSure    Collection Time: 10/10/24 12:00 AM   Result Value Ref Range    Amnisure, POC Negative Negative    Lot Number [id number]     Positive QC Pass/Fail Acceptable     Negative QC Pass/Fail Acceptable    Urinalysis    Collection Time: 10/10/24 10:50 AM   Result Value Ref Range    Color, UA YELLOW Straw/Yellow    Clarity, UA Clear Clear    Glucose, Ur Negative Negative mg/dL    Bilirubin, Urine Negative Negative    Ketones, Urine Negative Negative mg/dL    Specific Gravity, UA 1.019 1.005 - 1.030    Blood, Urine TRACE

## 2024-10-10 NOTE — NURSING NOTE
Patient denied assessment, pt refused to take medication until she is ready. Also states she will let me know when she would like to be monitored. Pt states her pain is currently 0/10 and has no other complaints at this time.
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
## 2024-10-11 NOTE — PROGRESS NOTES
Progress note  Pt received iv rochephin and  iv iron, no problems,  Fht's cat 1. No decels no contractions  Nurse informed that pt has signed herself out AMA again

## 2024-10-14 ENCOUNTER — HOSPITAL ENCOUNTER (INPATIENT)
Facility: HOSPITAL | Age: 19
LOS: 3 days | Discharge: HOME | End: 2024-10-17
Attending: OBSTETRICS & GYNECOLOGY | Admitting: OBSTETRICS & GYNECOLOGY
Payer: COMMERCIAL

## 2024-10-14 ENCOUNTER — ANESTHESIA (OUTPATIENT)
Dept: LABOR AND DELIVERY | Facility: HOSPITAL | Age: 19
End: 2024-10-14
Payer: COMMERCIAL

## 2024-10-14 ENCOUNTER — ANESTHESIA EVENT (OUTPATIENT)
Dept: LABOR AND DELIVERY | Facility: HOSPITAL | Age: 19
End: 2024-10-14
Payer: COMMERCIAL

## 2024-10-14 DIAGNOSIS — Z3A.31 31 WEEKS GESTATION OF PREGNANCY: Primary | ICD-10-CM

## 2024-10-14 PROBLEM — O36.8390 FETAL HEART RATE DECELERATIONS AFFECTING MANAGEMENT OF MOTHER: Status: ACTIVE | Noted: 2024-10-14

## 2024-10-14 PROBLEM — O36.8390 FETAL HEART RATE DECELERATIONS AFFECTING MANAGEMENT OF MOTHER: Status: RESOLVED | Noted: 2024-10-14 | Resolved: 2024-10-14

## 2024-10-14 PROBLEM — O23.593 TRICHOMONAL VAGINITIS DURING PREGNANCY IN THIRD TRIMESTER: Status: RESOLVED | Noted: 2024-09-29 | Resolved: 2024-10-14

## 2024-10-14 PROBLEM — O09.33 NO PRENATAL CARE IN CURRENT PREGNANCY IN THIRD TRIMESTER: Status: RESOLVED | Noted: 2024-09-29 | Resolved: 2024-10-14

## 2024-10-14 PROBLEM — Z3A.29 29 WEEKS GESTATION OF PREGNANCY: Status: RESOLVED | Noted: 2024-09-29 | Resolved: 2024-10-14

## 2024-10-14 PROBLEM — O26.893 ABDOMINAL PAIN DURING PREGNANCY IN THIRD TRIMESTER: Status: ACTIVE | Noted: 2024-10-14

## 2024-10-14 PROBLEM — O46.93 VAGINAL BLEEDING IN PREGNANCY, THIRD TRIMESTER: Status: RESOLVED | Noted: 2024-09-29 | Resolved: 2024-10-14

## 2024-10-14 PROBLEM — O71.03: Status: RESOLVED | Noted: 2024-09-29 | Resolved: 2024-10-14

## 2024-10-14 PROBLEM — O26.893 ABDOMINAL PAIN DURING PREGNANCY IN THIRD TRIMESTER: Status: RESOLVED | Noted: 2024-10-14 | Resolved: 2024-10-14

## 2024-10-14 PROBLEM — Z34.90 PREGNANCY: Status: RESOLVED | Noted: 2024-09-30 | Resolved: 2024-10-14

## 2024-10-14 PROBLEM — A59.01 TRICHOMONAL VAGINITIS DURING PREGNANCY IN THIRD TRIMESTER: Status: RESOLVED | Noted: 2024-09-29 | Resolved: 2024-10-14

## 2024-10-14 PROBLEM — O71.00 DEHISCENCE (WITHOUT EXTENSION) OF OLD UTERINE SCAR BEFORE ONSET OF LABOR, ANTEPARTUM CONDITION OR COMPLICATION: Status: RESOLVED | Noted: 2024-10-04 | Resolved: 2024-10-14

## 2024-10-14 PROBLEM — Z3A.30 30 WEEKS GESTATION OF PREGNANCY: Status: RESOLVED | Noted: 2024-10-10 | Resolved: 2024-10-14

## 2024-10-14 PROBLEM — O23.40 UTI (URINARY TRACT INFECTION) DURING PREGNANCY: Status: RESOLVED | Noted: 2024-09-29 | Resolved: 2024-10-14

## 2024-10-14 PROBLEM — R10.9 ABDOMINAL PAIN DURING PREGNANCY IN THIRD TRIMESTER: Status: ACTIVE | Noted: 2024-10-14

## 2024-10-14 PROBLEM — O99.820 GBS (GROUP B STREPTOCOCCUS CARRIER), +RV CULTURE, CURRENTLY PREGNANT: Status: RESOLVED | Noted: 2024-10-03 | Resolved: 2024-10-14

## 2024-10-14 PROBLEM — R10.9 ABDOMINAL PAIN DURING PREGNANCY IN THIRD TRIMESTER: Status: RESOLVED | Noted: 2024-10-14 | Resolved: 2024-10-14

## 2024-10-14 PROBLEM — O34.219 PREGNANCY WITH HISTORY OF CESAREAN SECTION, ANTEPARTUM: Status: RESOLVED | Noted: 2024-09-29 | Resolved: 2024-10-14

## 2024-10-14 LAB
ABO GROUP BLD: NORMAL
AMPHET+METHAMPHET UR QL: NEGATIVE
AMPHETAMINES UR QL: NEGATIVE
BACTERIA UR QL AUTO: ABNORMAL /HPF
BARBITURATES UR QL SCN: NEGATIVE
BENZODIAZ UR QL SCN: NEGATIVE
BILIRUB UR QL STRIP: NEGATIVE
BLD GP AB SCN SERPL QL: NEGATIVE
BUPRENORPHINE SERPL-MCNC: NEGATIVE NG/ML
CANNABINOIDS SERPL QL: POSITIVE
CLARITY UR: ABNORMAL
COCAINE UR QL: NEGATIVE
COLOR UR: YELLOW
DEPRECATED RDW RBC AUTO: 44.2 FL (ref 37–54)
ERYTHROCYTE [DISTWIDTH] IN BLOOD BY AUTOMATED COUNT: 17.6 % (ref 12.3–15.4)
FENTANYL UR-MCNC: NEGATIVE NG/ML
GLUCOSE UR STRIP-MCNC: NEGATIVE MG/DL
HCT VFR BLD AUTO: 36.5 % (ref 34–46.6)
HGB BLD-MCNC: 11.3 G/DL (ref 12–15.9)
HGB UR QL STRIP.AUTO: NEGATIVE
HYALINE CASTS UR QL AUTO: ABNORMAL /LPF
KETONES UR QL STRIP: NEGATIVE
LEUKOCYTE ESTERASE UR QL STRIP.AUTO: ABNORMAL
MCH RBC QN AUTO: 26.8 PG (ref 26.6–33)
MCHC RBC AUTO-ENTMCNC: 31 G/DL (ref 31.5–35.7)
MCV RBC AUTO: 86.5 FL (ref 79–97)
METHADONE UR QL SCN: NEGATIVE
NITRITE UR QL STRIP: NEGATIVE
OPIATES UR QL: NEGATIVE
OXYCODONE UR QL SCN: NEGATIVE
PCP UR QL SCN: NEGATIVE
PH UR STRIP.AUTO: 7 [PH] (ref 5–8)
PLATELET # BLD AUTO: 242 10*3/MM3 (ref 140–450)
PMV BLD AUTO: 12.3 FL (ref 6–12)
PROT UR QL STRIP: ABNORMAL
RBC # BLD AUTO: 4.22 10*6/MM3 (ref 3.77–5.28)
RBC # UR STRIP: ABNORMAL /HPF
REF LAB TEST METHOD: ABNORMAL
RH BLD: POSITIVE
SP GR UR STRIP: 1.02 (ref 1–1.03)
SQUAMOUS #/AREA URNS HPF: ABNORMAL /HPF
T&S EXPIRATION DATE: NORMAL
TRICYCLICS UR QL SCN: NEGATIVE
UROBILINOGEN UR QL STRIP: ABNORMAL
WBC # UR STRIP: ABNORMAL /HPF
WBC NRBC COR # BLD AUTO: 9.24 10*3/MM3 (ref 3.4–10.8)

## 2024-10-14 PROCEDURE — 25010000002 OXYTOCIN PER 10 UNITS: Performed by: NURSE ANESTHETIST, CERTIFIED REGISTERED

## 2024-10-14 PROCEDURE — 25010000002 HYDROMORPHONE 1 MG/ML SOLUTION: Performed by: NURSE ANESTHETIST, CERTIFIED REGISTERED

## 2024-10-14 PROCEDURE — 86900 BLOOD TYPING SEROLOGIC ABO: CPT | Performed by: OBSTETRICS & GYNECOLOGY

## 2024-10-14 PROCEDURE — 80307 DRUG TEST PRSMV CHEM ANLYZR: CPT | Performed by: OBSTETRICS & GYNECOLOGY

## 2024-10-14 PROCEDURE — 51702 INSERT TEMP BLADDER CATH: CPT

## 2024-10-14 PROCEDURE — 87086 URINE CULTURE/COLONY COUNT: CPT | Performed by: OBSTETRICS & GYNECOLOGY

## 2024-10-14 PROCEDURE — 25010000002 ONDANSETRON PER 1 MG: Performed by: NURSE ANESTHETIST, CERTIFIED REGISTERED

## 2024-10-14 PROCEDURE — 25010000002 CLINDAMYCIN 900 MG/50ML SOLUTION: Performed by: OBSTETRICS & GYNECOLOGY

## 2024-10-14 PROCEDURE — 25010000002 GENTAMICIN PER 80 MG: Performed by: OBSTETRICS & GYNECOLOGY

## 2024-10-14 PROCEDURE — 25010000002 ONDANSETRON PER 1 MG: Performed by: OBSTETRICS & GYNECOLOGY

## 2024-10-14 PROCEDURE — 25810000003 LACTATED RINGERS SOLUTION: Performed by: OBSTETRICS & GYNECOLOGY

## 2024-10-14 PROCEDURE — 25010000002 BUPIVACAINE PF 0.75 % SOLUTION: Performed by: NURSE ANESTHETIST, CERTIFIED REGISTERED

## 2024-10-14 PROCEDURE — 25010000002 MIDAZOLAM PER 1MG: Performed by: NURSE ANESTHETIST, CERTIFIED REGISTERED

## 2024-10-14 PROCEDURE — 86901 BLOOD TYPING SEROLOGIC RH(D): CPT | Performed by: OBSTETRICS & GYNECOLOGY

## 2024-10-14 PROCEDURE — 81001 URINALYSIS AUTO W/SCOPE: CPT | Performed by: OBSTETRICS & GYNECOLOGY

## 2024-10-14 PROCEDURE — 85027 COMPLETE CBC AUTOMATED: CPT | Performed by: OBSTETRICS & GYNECOLOGY

## 2024-10-14 PROCEDURE — 88302 TISSUE EXAM BY PATHOLOGIST: CPT | Performed by: OBSTETRICS & GYNECOLOGY

## 2024-10-14 PROCEDURE — 0UT70ZZ RESECTION OF BILATERAL FALLOPIAN TUBES, OPEN APPROACH: ICD-10-PCS | Performed by: OBSTETRICS & GYNECOLOGY

## 2024-10-14 PROCEDURE — G0480 DRUG TEST DEF 1-7 CLASSES: HCPCS | Performed by: OBSTETRICS & GYNECOLOGY

## 2024-10-14 PROCEDURE — 25010000002 KETOROLAC TROMETHAMINE PER 15 MG: Performed by: OBSTETRICS & GYNECOLOGY

## 2024-10-14 PROCEDURE — 86850 RBC ANTIBODY SCREEN: CPT | Performed by: OBSTETRICS & GYNECOLOGY

## 2024-10-14 PROCEDURE — 25810000003 LACTATED RINGERS PER 1000 ML: Performed by: OBSTETRICS & GYNECOLOGY

## 2024-10-14 RX ORDER — SODIUM CHLORIDE 0.9 % (FLUSH) 0.9 %
10 SYRINGE (ML) INJECTION EVERY 12 HOURS SCHEDULED
Status: DISCONTINUED | OUTPATIENT
Start: 2024-10-14 | End: 2024-10-17 | Stop reason: HOSPADM

## 2024-10-14 RX ORDER — KETOROLAC TROMETHAMINE 15 MG/ML
15 INJECTION, SOLUTION INTRAMUSCULAR; INTRAVENOUS EVERY 6 HOURS
Status: DISPENSED | OUTPATIENT
Start: 2024-10-14 | End: 2024-10-15

## 2024-10-14 RX ORDER — ONDANSETRON 2 MG/ML
4 INJECTION INTRAMUSCULAR; INTRAVENOUS EVERY 6 HOURS PRN
Status: DISCONTINUED | OUTPATIENT
Start: 2024-10-14 | End: 2024-10-14 | Stop reason: SDUPTHER

## 2024-10-14 RX ORDER — MIDAZOLAM HYDROCHLORIDE 2 MG/2ML
INJECTION, SOLUTION INTRAMUSCULAR; INTRAVENOUS AS NEEDED
Status: DISCONTINUED | OUTPATIENT
Start: 2024-10-14 | End: 2024-10-14 | Stop reason: SURG

## 2024-10-14 RX ORDER — ACETAMINOPHEN 325 MG/1
650 TABLET ORAL EVERY 4 HOURS PRN
Status: DISCONTINUED | OUTPATIENT
Start: 2024-10-14 | End: 2024-10-17 | Stop reason: HOSPADM

## 2024-10-14 RX ORDER — OXYCODONE HYDROCHLORIDE 5 MG/1
10 TABLET ORAL EVERY 4 HOURS PRN
Status: DISCONTINUED | OUTPATIENT
Start: 2024-10-14 | End: 2024-10-17

## 2024-10-14 RX ORDER — DROPERIDOL 2.5 MG/ML
0.62 INJECTION, SOLUTION INTRAMUSCULAR; INTRAVENOUS ONCE AS NEEDED
Status: DISCONTINUED | OUTPATIENT
Start: 2024-10-14 | End: 2024-10-17 | Stop reason: HOSPADM

## 2024-10-14 RX ORDER — ACETAMINOPHEN 500 MG
1000 TABLET ORAL ONCE
Status: DISCONTINUED | OUTPATIENT
Start: 2024-10-14 | End: 2024-10-14 | Stop reason: HOSPADM

## 2024-10-14 RX ORDER — FAMOTIDINE 10 MG/ML
20 INJECTION, SOLUTION INTRAVENOUS ONCE AS NEEDED
Status: DISCONTINUED | OUTPATIENT
Start: 2024-10-14 | End: 2024-10-14 | Stop reason: HOSPADM

## 2024-10-14 RX ORDER — METHYLERGONOVINE MALEATE 0.2 MG/ML
200 INJECTION INTRAVENOUS AS NEEDED
Status: DISCONTINUED | OUTPATIENT
Start: 2024-10-14 | End: 2024-10-17 | Stop reason: HOSPADM

## 2024-10-14 RX ORDER — NALBUPHINE HYDROCHLORIDE 10 MG/ML
2.5 INJECTION, SOLUTION INTRAMUSCULAR; INTRAVENOUS; SUBCUTANEOUS EVERY 4 HOURS PRN
Status: DISCONTINUED | OUTPATIENT
Start: 2024-10-14 | End: 2024-10-14

## 2024-10-14 RX ORDER — ONDANSETRON 4 MG/1
4 TABLET, ORALLY DISINTEGRATING ORAL EVERY 8 HOURS PRN
Status: DISCONTINUED | OUTPATIENT
Start: 2024-10-14 | End: 2024-10-14 | Stop reason: SDUPTHER

## 2024-10-14 RX ORDER — OXYCODONE HYDROCHLORIDE 5 MG/1
5 TABLET ORAL EVERY 4 HOURS PRN
Status: DISCONTINUED | OUTPATIENT
Start: 2024-10-14 | End: 2024-10-17 | Stop reason: HOSPADM

## 2024-10-14 RX ORDER — OXYTOCIN 10 [USP'U]/ML
INJECTION, SOLUTION INTRAMUSCULAR; INTRAVENOUS AS NEEDED
Status: DISCONTINUED | OUTPATIENT
Start: 2024-10-14 | End: 2024-10-14 | Stop reason: SURG

## 2024-10-14 RX ORDER — DOCUSATE SODIUM 100 MG/1
100 CAPSULE, LIQUID FILLED ORAL 2 TIMES DAILY PRN
Status: DISCONTINUED | OUTPATIENT
Start: 2024-10-14 | End: 2024-10-14 | Stop reason: SDUPTHER

## 2024-10-14 RX ORDER — PRENATAL VIT/IRON FUM/FOLIC AC 27MG-0.8MG
1 TABLET ORAL DAILY
Status: DISCONTINUED | OUTPATIENT
Start: 2024-10-14 | End: 2024-10-17 | Stop reason: HOSPADM

## 2024-10-14 RX ORDER — OXYTOCIN/0.9 % SODIUM CHLORIDE 30/500 ML
125 PLASTIC BAG, INJECTION (ML) INTRAVENOUS ONCE AS NEEDED
Status: COMPLETED | OUTPATIENT
Start: 2024-10-14 | End: 2024-10-14

## 2024-10-14 RX ORDER — SODIUM CHLORIDE, SODIUM LACTATE, POTASSIUM CHLORIDE, CALCIUM CHLORIDE 600; 310; 30; 20 MG/100ML; MG/100ML; MG/100ML; MG/100ML
125 INJECTION, SOLUTION INTRAVENOUS CONTINUOUS
Status: DISCONTINUED | OUTPATIENT
Start: 2024-10-14 | End: 2024-10-17 | Stop reason: RX

## 2024-10-14 RX ORDER — OXYTOCIN/0.9 % SODIUM CHLORIDE 30/500 ML
999 PLASTIC BAG, INJECTION (ML) INTRAVENOUS ONCE
Status: DISCONTINUED | OUTPATIENT
Start: 2024-10-14 | End: 2024-10-17 | Stop reason: HOSPADM

## 2024-10-14 RX ORDER — SODIUM CHLORIDE 0.9 % (FLUSH) 0.9 %
10 SYRINGE (ML) INJECTION AS NEEDED
Status: DISCONTINUED | OUTPATIENT
Start: 2024-10-14 | End: 2024-10-14 | Stop reason: HOSPADM

## 2024-10-14 RX ORDER — MISOPROSTOL 200 UG/1
800 TABLET ORAL AS NEEDED
Status: DISCONTINUED | OUTPATIENT
Start: 2024-10-14 | End: 2024-10-14 | Stop reason: HOSPADM

## 2024-10-14 RX ORDER — NALOXONE HCL 0.4 MG/ML
0.4 VIAL (ML) INJECTION
Status: ACTIVE | OUTPATIENT
Start: 2024-10-14 | End: 2024-10-15

## 2024-10-14 RX ORDER — ONDANSETRON 2 MG/ML
4 INJECTION INTRAMUSCULAR; INTRAVENOUS EVERY 8 HOURS PRN
Status: DISCONTINUED | OUTPATIENT
Start: 2024-10-14 | End: 2024-10-17 | Stop reason: HOSPADM

## 2024-10-14 RX ORDER — METOCLOPRAMIDE HYDROCHLORIDE 5 MG/ML
10 INJECTION INTRAMUSCULAR; INTRAVENOUS ONCE
Status: DISCONTINUED | OUTPATIENT
Start: 2024-10-14 | End: 2024-10-14 | Stop reason: HOSPADM

## 2024-10-14 RX ORDER — CLINDAMYCIN PHOSPHATE 900 MG/50ML
900 INJECTION, SOLUTION INTRAVENOUS ONCE
Status: COMPLETED | OUTPATIENT
Start: 2024-10-14 | End: 2024-10-14

## 2024-10-14 RX ORDER — BISACODYL 10 MG
10 SUPPOSITORY, RECTAL RECTAL DAILY PRN
Status: DISCONTINUED | OUTPATIENT
Start: 2024-10-14 | End: 2024-10-17 | Stop reason: HOSPADM

## 2024-10-14 RX ORDER — LEVETIRACETAM 500 MG/1
500 TABLET ORAL EVERY 12 HOURS SCHEDULED
Status: DISCONTINUED | OUTPATIENT
Start: 2024-10-14 | End: 2024-10-17 | Stop reason: HOSPADM

## 2024-10-14 RX ORDER — SODIUM CHLORIDE 9 MG/ML
40 INJECTION, SOLUTION INTRAVENOUS AS NEEDED
Status: DISCONTINUED | OUTPATIENT
Start: 2024-10-14 | End: 2024-10-14 | Stop reason: HOSPADM

## 2024-10-14 RX ORDER — IBUPROFEN 600 MG/1
600 TABLET, FILM COATED ORAL EVERY 6 HOURS
Status: DISCONTINUED | OUTPATIENT
Start: 2024-10-15 | End: 2024-10-17 | Stop reason: HOSPADM

## 2024-10-14 RX ORDER — SODIUM CHLORIDE 0.9 % (FLUSH) 0.9 %
10 SYRINGE (ML) INJECTION AS NEEDED
Status: DISCONTINUED | OUTPATIENT
Start: 2024-10-14 | End: 2024-10-17 | Stop reason: HOSPADM

## 2024-10-14 RX ORDER — SIMETHICONE 80 MG
80 TABLET,CHEWABLE ORAL 4 TIMES DAILY PRN
Status: DISCONTINUED | OUTPATIENT
Start: 2024-10-14 | End: 2024-10-17 | Stop reason: HOSPADM

## 2024-10-14 RX ORDER — CITRIC ACID/SODIUM CITRATE 334-500MG
15 SOLUTION, ORAL ORAL ONCE
Status: DISCONTINUED | OUTPATIENT
Start: 2024-10-14 | End: 2024-10-14 | Stop reason: HOSPADM

## 2024-10-14 RX ORDER — ONDANSETRON 4 MG/1
8 TABLET, ORALLY DISINTEGRATING ORAL EVERY 8 HOURS PRN
Status: DISCONTINUED | OUTPATIENT
Start: 2024-10-14 | End: 2024-10-17 | Stop reason: HOSPADM

## 2024-10-14 RX ORDER — DOCUSATE SODIUM 100 MG/1
100 CAPSULE, LIQUID FILLED ORAL 2 TIMES DAILY PRN
Status: DISCONTINUED | OUTPATIENT
Start: 2024-10-14 | End: 2024-10-17 | Stop reason: HOSPADM

## 2024-10-14 RX ORDER — CALCIUM CARBONATE 500 MG/1
2 TABLET, CHEWABLE ORAL DAILY PRN
Status: DISCONTINUED | OUTPATIENT
Start: 2024-10-14 | End: 2024-10-17 | Stop reason: HOSPADM

## 2024-10-14 RX ORDER — ACETAMINOPHEN 325 MG/1
650 TABLET ORAL EVERY 6 HOURS
Status: DISCONTINUED | OUTPATIENT
Start: 2024-10-15 | End: 2024-10-15

## 2024-10-14 RX ORDER — PHENYLEPHRINE HCL IN 0.9% NACL 1 MG/10 ML
SYRINGE (ML) INTRAVENOUS AS NEEDED
Status: DISCONTINUED | OUTPATIENT
Start: 2024-10-14 | End: 2024-10-14 | Stop reason: SURG

## 2024-10-14 RX ORDER — MISOPROSTOL 200 UG/1
800 TABLET ORAL ONCE AS NEEDED
Status: DISCONTINUED | OUTPATIENT
Start: 2024-10-14 | End: 2024-10-17 | Stop reason: HOSPADM

## 2024-10-14 RX ORDER — ACETAMINOPHEN 500 MG
1000 TABLET ORAL EVERY 6 HOURS
Status: DISCONTINUED | OUTPATIENT
Start: 2024-10-14 | End: 2024-10-15

## 2024-10-14 RX ORDER — KETOROLAC TROMETHAMINE 30 MG/ML
30 INJECTION, SOLUTION INTRAMUSCULAR; INTRAVENOUS ONCE
Status: COMPLETED | OUTPATIENT
Start: 2024-10-14 | End: 2024-10-14

## 2024-10-14 RX ORDER — SODIUM CHLORIDE 0.9 % (FLUSH) 0.9 %
10 SYRINGE (ML) INJECTION EVERY 12 HOURS SCHEDULED
Status: DISCONTINUED | OUTPATIENT
Start: 2024-10-14 | End: 2024-10-14 | Stop reason: HOSPADM

## 2024-10-14 RX ORDER — NALOXONE HCL 0.4 MG/ML
0.1 VIAL (ML) INJECTION
Status: DISCONTINUED | OUTPATIENT
Start: 2024-10-14 | End: 2024-10-15 | Stop reason: ALTCHOICE

## 2024-10-14 RX ORDER — ONDANSETRON 2 MG/ML
INJECTION INTRAMUSCULAR; INTRAVENOUS AS NEEDED
Status: DISCONTINUED | OUTPATIENT
Start: 2024-10-14 | End: 2024-10-14 | Stop reason: SURG

## 2024-10-14 RX ORDER — RISPERIDONE 1 MG/ML
1 SOLUTION ORAL EVERY 12 HOURS SCHEDULED
Status: DISCONTINUED | OUTPATIENT
Start: 2024-10-14 | End: 2024-10-17 | Stop reason: HOSPADM

## 2024-10-14 RX ORDER — METHYLERGONOVINE MALEATE 0.2 MG/ML
200 INJECTION INTRAVENOUS ONCE AS NEEDED
Status: DISCONTINUED | OUTPATIENT
Start: 2024-10-14 | End: 2024-10-14 | Stop reason: HOSPADM

## 2024-10-14 RX ORDER — EPHEDRINE SULFATE 50 MG/ML
INJECTION INTRAVENOUS AS NEEDED
Status: DISCONTINUED | OUTPATIENT
Start: 2024-10-14 | End: 2024-10-14 | Stop reason: SURG

## 2024-10-14 RX ORDER — CARBOPROST TROMETHAMINE 250 UG/ML
250 INJECTION, SOLUTION INTRAMUSCULAR ONCE AS NEEDED
Status: DISCONTINUED | OUTPATIENT
Start: 2024-10-14 | End: 2024-10-17 | Stop reason: HOSPADM

## 2024-10-14 RX ORDER — OXYTOCIN/0.9 % SODIUM CHLORIDE 30/500 ML
250 PLASTIC BAG, INJECTION (ML) INTRAVENOUS CONTINUOUS
Status: ACTIVE | OUTPATIENT
Start: 2024-10-14 | End: 2024-10-14

## 2024-10-14 RX ORDER — BUPIVACAINE HYDROCHLORIDE 7.5 MG/ML
INJECTION, SOLUTION EPIDURAL; RETROBULBAR AS NEEDED
Status: DISCONTINUED | OUTPATIENT
Start: 2024-10-14 | End: 2024-10-14 | Stop reason: SURG

## 2024-10-14 RX ORDER — ONDANSETRON 2 MG/ML
4 INJECTION INTRAMUSCULAR; INTRAVENOUS ONCE AS NEEDED
Status: DISCONTINUED | OUTPATIENT
Start: 2024-10-14 | End: 2024-10-14 | Stop reason: HOSPADM

## 2024-10-14 RX ORDER — CARBOPROST TROMETHAMINE 250 UG/ML
250 INJECTION, SOLUTION INTRAMUSCULAR AS NEEDED
Status: DISCONTINUED | OUTPATIENT
Start: 2024-10-14 | End: 2024-10-14 | Stop reason: HOSPADM

## 2024-10-14 RX ORDER — HYDROMORPHONE HYDROCHLORIDE 1 MG/ML
0.5 INJECTION, SOLUTION INTRAMUSCULAR; INTRAVENOUS; SUBCUTANEOUS
Status: DISCONTINUED | OUTPATIENT
Start: 2024-10-14 | End: 2024-10-15 | Stop reason: ALTCHOICE

## 2024-10-14 RX ADMIN — Medication 100 MCG: at 13:23

## 2024-10-14 RX ADMIN — BUPIVACAINE HYDROCHLORIDE 1.7 MG: 7.5 INJECTION, SOLUTION EPIDURAL; RETROBULBAR at 12:42

## 2024-10-14 RX ADMIN — Medication 100 MCG: at 13:17

## 2024-10-14 RX ADMIN — HYDROMORPHONE HYDROCHLORIDE 900 MCG: 1 INJECTION, SOLUTION INTRAMUSCULAR; INTRAVENOUS; SUBCUTANEOUS at 13:24

## 2024-10-14 RX ADMIN — ACETAMINOPHEN 1000 MG: 500 TABLET, FILM COATED ORAL at 17:46

## 2024-10-14 RX ADMIN — EPHEDRINE SULFATE 25 MG: 50 INJECTION INTRAVENOUS at 13:36

## 2024-10-14 RX ADMIN — RISPERIDONE 1 MG: 1 SOLUTION ORAL at 20:45

## 2024-10-14 RX ADMIN — CLINDAMYCIN PHOSPHATE 900 MG: 900 INJECTION, SOLUTION INTRAVENOUS at 12:39

## 2024-10-14 RX ADMIN — Medication 100 MCG: at 12:52

## 2024-10-14 RX ADMIN — Medication 125 ML/HR: at 14:00

## 2024-10-14 RX ADMIN — SODIUM CHLORIDE, POTASSIUM CHLORIDE, SODIUM LACTATE AND CALCIUM CHLORIDE 125 ML/HR: 600; 310; 30; 20 INJECTION, SOLUTION INTRAVENOUS at 12:15

## 2024-10-14 RX ADMIN — KETOROLAC TROMETHAMINE 30 MG: 30 INJECTION, SOLUTION INTRAMUSCULAR; INTRAVENOUS at 14:18

## 2024-10-14 RX ADMIN — CLINDAMYCIN PHOSPHATE 900 MG: 900 INJECTION, SOLUTION INTRAVENOUS at 12:35

## 2024-10-14 RX ADMIN — Medication 100 MCG: at 13:03

## 2024-10-14 RX ADMIN — GENTAMICIN SULFATE 340 MG: 40 INJECTION, SOLUTION INTRAMUSCULAR; INTRAVENOUS at 12:53

## 2024-10-14 RX ADMIN — MIDAZOLAM HYDROCHLORIDE 2 MG: 1 INJECTION, SOLUTION INTRAMUSCULAR; INTRAVENOUS at 13:30

## 2024-10-14 RX ADMIN — LEVETIRACETAM 500 MG: 500 TABLET, FILM COATED ORAL at 20:45

## 2024-10-14 RX ADMIN — KETOROLAC TROMETHAMINE 15 MG: 15 INJECTION, SOLUTION INTRAMUSCULAR; INTRAVENOUS at 20:44

## 2024-10-14 RX ADMIN — OXYTOCIN 20 UNITS: 10 INJECTION INTRAVENOUS at 13:02

## 2024-10-14 RX ADMIN — ONDANSETRON 8 MG: 2 INJECTION INTRAMUSCULAR; INTRAVENOUS at 12:38

## 2024-10-14 RX ADMIN — ONDANSETRON 4 MG: 2 INJECTION INTRAMUSCULAR; INTRAVENOUS at 20:44

## 2024-10-14 RX ADMIN — HYDROMORPHONE HYDROCHLORIDE 100 MCG: 1 INJECTION, SOLUTION INTRAMUSCULAR; INTRAVENOUS; SUBCUTANEOUS at 12:42

## 2024-10-14 RX ADMIN — SODIUM CHLORIDE, POTASSIUM CHLORIDE, SODIUM LACTATE AND CALCIUM CHLORIDE 1000 ML: 600; 310; 30; 20 INJECTION, SOLUTION INTRAVENOUS at 12:16

## 2024-10-14 NOTE — H&P
[facility]  Obstetric History and Physical    Chief Complaint   Patient presents with    Contractions       Subjective     Patient is a 19 y.o. female  currently at 31w2d, who is well-known to the [facility], returns again with suprapubic abdominal pain after previously signing out AMA on 10/10/2024.  She was previously admitted for inpatient monitoring after a ~2cm uterine dehiscence was seen on prior ultrasound on 2024.  After leaving AMA she went to stay at a hotel with the assumption she was being delivered today (delivery planned at 32 weeks per MFM). Reports dysuria and increased mucus vaginal discharge.  During previous admissions she was treated for trichomoniasis, chlamydia, yeast, and a UTI.     Previously had no prenatal care.  Complicated social history described in prior notes.    The following portions of the patients history were reviewed and updated as appropriate: current medications, allergies, past medical history, past surgical history, past family history, past social history, and problem list .       Prenatal Information:  Prenatal Results       Initial Prenatal Labs       Test Value Reference Range Date Time    Hemoglobin        Hematocrit        Platelets        Rubella IgG        Hepatitis B SAg  Non-Reactive  Non-Reactive 24 0003    Hepatitis C Ab  Non-Reactive  Non-Reactive 24 0003    RPR        T. Pallidum Ab   Non-Reactive  Non-Reactive 10/10/24 0008       Non-Reactive  Non-Reactive 24 0003    ABO  AB   10/10/24 0008    Rh  Positive   10/10/24 0008    Antibody Screen        HIV  Non-Reactive  Non-Reactive 24 0003    Urine Culture  No growth   10/06/24 1047       25,000 CFU/mL Normal Urogenital Gillian   24 2140    Gonorrhea  Not Detected  Not Detected 24 2311    Chlamydia  Detected  Not Detected 24 2311    TSH        HgB A1c   5.20 % 4.80 - 5.60 24 2140    Varicella IgG        Hemoglobinopathy Fractionation        Hemoglobinopathy  (genetic testing)        Cystic fibrosis                   Fetal testing        Test Value Reference Range Date Time    NIPT        MSAFP        AFP-4                  2nd and 3rd Trimester       Test Value Reference Range Date Time    Hemoglobin (repeated)  8.9 g/dL 12.0 - 15.9 10/10/24 0008       9.1 g/dL 12.0 - 15.9 10/04/24 2215       9.3 g/dL 12.0 - 15.9 10/01/24 1446       9.9 g/dL 12.0 - 15.9 09/29/24 2140    Hematocrit (repeated)  28.7 % 34.0 - 46.6 10/10/24 0008       28.7 % 34.0 - 46.6 10/04/24 2215       29.3 % 34.0 - 46.6 10/01/24 1446       31.0 % 34.0 - 46.6 09/29/24 2140    Platelets   260 10*3/mm3 140 - 450 10/10/24 0008       248 10*3/mm3 140 - 450 10/04/24 2215       245 10*3/mm3 140 - 450 10/01/24 1446       294 10*3/mm3 140 - 450 09/29/24 2140    1 hour GTT         Antibody Screen (repeated)  Negative   10/10/24 0008       Negative   10/04/24 2215       Negative   10/02/24 0608       Negative   09/29/24 2140    3rd TM syphilis scrn (repeated)  RPR         3rd TM syphilis scrn (repeated) TP-Ab  Non-Reactive  Non-Reactive 10/10/24 0008       Non-Reactive  Non-Reactive 09/30/24 0003    3rd TM syphilis screen TB-Ab (FTA)  Non-Reactive  Non-Reactive 10/10/24 0008       Non-Reactive  Non-Reactive 09/30/24 0003    Syphilis cascade test TP-Ab (EIA)        Syphilis cascade TPPA        GTT Fasting        GTT 1 Hr        GTT 2 Hr        GTT 3 Hr        Group B Strep  Streptococcus agalactiae (Group B)   09/30/24 1622      ^ Positive   09/30/24               Other testing        Test Value Reference Range Date Time    Parvo IgG         CMV IgG                   Drug Screening       Test Value Reference Range Date Time    Amphetamine Screen  Negative  Negative 10/02/24 1027       Negative  Negative 09/29/24 2140    Barbiturate Screen  Negative  Negative 10/02/24 1027       Negative  Negative 09/29/24 2140    Benzodiazepine Screen  Negative  Negative 10/02/24 1027       Negative  Negative 09/29/24 2145     Methadone Screen  Negative  Negative 10/02/24 1027       Negative  Negative 09/29/24 2140    Phencyclidine Screen  Negative  Negative 10/02/24 1027       Negative  Negative 09/29/24 2140    Opiates Screen  Negative  Negative 10/02/24 1027       Negative  Negative 09/29/24 2140    THC Screen  Positive  Negative 10/02/24 1027       Positive  Negative 09/29/24 2140    Cocaine Screen  Negative  Negative 10/02/24 1027       Negative  Negative 09/29/24 2140    Propoxyphene Screen        Buprenorphine Screen  Negative  Negative 10/02/24 1027       Negative  Negative 09/29/24 2140    Methamphetamine Screen  Negative  Negative 10/02/24 1027       Negative  Negative 09/29/24 2140    Oxycodone Screen  Negative  Negative 10/02/24 1027       Negative  Negative 09/29/24 2140    Tricyclic Antidepressants Screen  Negative  Negative 10/02/24 1027       Negative  Negative 09/29/24 2140              Legend    ^: Historical                          External Prenatal Results       Pregnancy Outside Results - Transcribed From Office Records - See Scanned Records For Details       Test Value Date Time    ABO  AB  10/10/24 0008    Rh  Positive  10/10/24 0008    Antibody Screen  Negative  10/10/24 0008       Negative  10/04/24 2215       Negative  10/02/24 0608       Negative  09/29/24 2140    Varicella IgG       Rubella       Hgb  8.9 g/dL 10/10/24 0008       9.1 g/dL 10/04/24 2215       9.3 g/dL 10/01/24 1446       9.9 g/dL 09/29/24 2140    Hct  28.7 % 10/10/24 0008       28.7 % 10/04/24 2215       29.3 % 10/01/24 1446       31.0 % 09/29/24 2140    HgB A1c   5.20 % 09/29/24 2140    1h GTT       3h GTT Fasting       3h GTT 1 hour       3h GTT 2 hour       3h GTT 3 hour        Gonorrhea (discrete)  Not Detected  09/29/24 2311    Chlamydia (discrete)  Detected  09/29/24 2311    RPR       Syphils cascade: TP-Ab (FTA)  Non-Reactive  10/10/24 0008       Non-Reactive  09/30/24 0003    TP-Ab  Non-Reactive  10/10/24 0008       Non-Reactive   24 0003    TP-Ab (EIA)       TPPA       HBsAg  Non-Reactive  24 0003    Herpes Simplex Virus PCR       Herpes Simplex VIrus Culture       HIV  Non-Reactive  24 0003    Hep C RNA Quant PCR       Hep C Antibody  Non-Reactive  24 0003    AFP       NIPT       Cystic Fibroisis        Group B Strep  Streptococcus agalactiae (Group B)  24 1622      ^ Positive  24     GBS Susceptibility to Clindamycin       GBS Susceptibility to Erythromycin       Fetal Fibronectin       Genetic Testing, Maternal Blood                 Drug Screening       Test Value Date Time    Urine Drug Screen       Amphetamine Screen  Negative  10/02/24 1027       Negative  24 2140    Barbiturate Screen  Negative  10/02/24 1027       Negative  24 2140    Benzodiazepine Screen  Negative  10/02/24 1027       Negative  24 2140    Methadone Screen  Negative  10/02/24 1027       Negative  24 2140    Phencyclidine Screen  Negative  10/02/24 1027       Negative  24 2140    Opiates Screen  Negative  10/02/24 1027       Negative  24 2140    THC Screen  Positive  10/02/24 1027       Positive  24 2140    Cocaine Screen       Propoxyphene Screen       Buprenorphine Screen  Negative  10/02/24 1027       Negative  24 2140    Methamphetamine Screen       Oxycodone Screen  Negative  10/02/24 1027       Negative  24 2140    Tricyclic Antidepressants Screen  Negative  10/02/24 1027       Negative  24 2140              Legend    ^: Historical                             Past OB History:     OB History    Para Term  AB Living   2 1 1 0 0 1   SAB IAB Ectopic Molar Multiple Live Births   0 0 0 0 0 1      # Outcome Date GA Lbr Madi/2nd Weight Sex Type Anes PTL Lv   2 Current            1 Term 20 39w1d / 00:11 3770 g (8 lb 5 oz) M CS-LTranv EPI, Gen N BRAULIO      Birth Comments: AGA      Complications: Fetal Intolerance      Name: PAMELA BASHIR      Apgar1: 2   Apgar5: 9       Past Medical History: Past Medical History:   Diagnosis Date    Allergic     Allergic rhinitis     Asthma     Attention deficit disorder     Bipolar 1 disorder     Chlamydia     Depression     Fetal alcohol spectrum disorder     Fetal alcohol syndrome     Gestational diabetes     Herpes     Insomnia     Nocturnal enuresis     Seizure       Past Surgical History Past Surgical History:   Procedure Laterality Date     SECTION N/A 7/3/2022    Procedure:  SECTION PRIMARY;  Surgeon: Jolene Bellamy DO;  Location: [facility] LABOR DELIVERY;  Service: Obstetrics;  Laterality: N/A;     SHUNT INSERTION       SHUNT REMOVAL        Family History: Family History   Adopted: Yes   Family history unknown: Yes      Social History:  reports that she has been smoking cigarettes. She has a 1.3 pack-year smoking history. She has been exposed to tobacco smoke. She has never used smokeless tobacco.   reports no history of alcohol use.   reports current drug use. Frequency: 1.00 time per week. Drug: Marijuana.        Review of Systems      Objective     Vital Signs Range for the last 24 hours  Temperature:     Temp Source:     BP: BP: (134)/(71) 134/71   Pulse: Heart Rate:  [112] 112   Respirations:     SPO2:     O2 Amount (l/min):     O2 Devices     Weight:       Physical Examination: General appearance - alert, well appearing, and in no distress  Abdomen -suprapubic tenderness to palpation    FHR: 155, mod panfilo, recurrent decel, no accels.   [place] Nizhoni: none    I performed bedside US, limited resolution, dehiscence separation measured 1.2 cm.    Assessment & Plan       Abdominal pain during pregnancy in third trimester    Dehiscence (without extension) of old uterine scar before onset of labor, antepartum condition or complication, third trimester    No prenatal care in current pregnancy in third trimester    Pregnancy with history of  section, antepartum    GBS (group B Streptococcus carrier), +RV  culture, currently pregnant    Dehiscence (without extension) of old uterine scar before onset of labor, antepartum condition or complication    31 weeks gestation of pregnancy    Fetal heart rate decelerations affecting management of mother      Assessment & Plan    Assessment:  1.  Ms. Trinidad is a 19-year-old G2, P1 at 31.2 weeks who presents with abdominal pain with a complicated prenatal history.  Notably at prior ultrasound there is a uterine dehiscence seen and was previously admitted for inpatient monitoring until planned delivery at 32 weeks.  During that prior admission she had left several times AMA, the most recently on 10/10/2024, and returns today with abdominal pain.  For the first 30 minutes of monitoring fetal heart rate was reassuring but then prolonged deceleration with celi to 70 followed by recurrent decelerations.  No contractions seen on toco no contractions felt by patient.  Given the previously new dehiscence, increasing abdominal pain, and now recurrent fetal heart decelerations, we will proceed with  delivery at this time.  Will also proceed with bilateral salpingectomy.  She previously signed tubal consent form and again today is certain that she does not desire future children.  I discussed the permanent nature of bilateral salpingectomy and that it is irreversible, she expressed understanding.  2.  Betamethasone previously completed on  and 10/1      Plan:  1.  Admit to labor and delivery under [facility].   2.  Routine section orders.  Tito and julienda for Given history of anaphylaxis with penicillins.  3.  Plan of care has been reviewed with patient and significant other  4.  Risks, benefits of treatment plan have been discussed.  5.  All questions have been answered.      Quirino Padilla,   10/14/2024  12:15 CDT
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
## 2024-10-14 NOTE — PLAN OF CARE
Problem: Adult Inpatient Plan of Care  Goal: Plan of Care Review  Outcome: Progressing  Flowsheets (Taken 10/14/2024 1428)  Progress: improving  Outcome Evaluation: pt now , uterus firm and 2 below the umbilicus. lochia scant to light. postpartum pitocin infusing.  Plan of Care Reviewed With: patient  Goal: Patient-Specific Goal (Individualized)  Outcome: Progressing  Goal: Absence of Hospital-Acquired Illness or Injury  Outcome: Progressing  Intervention: Prevent Skin Injury  Recent Flowsheet Documentation  Taken 10/14/2024 1400 by Penny Stanley RN  Body Position: supine  Taken 10/14/2024 1345 by Penny Stanley RN  Body Position: supine  Intervention: Prevent and Manage VTE (Venous Thromboembolism) Risk  Recent Flowsheet Documentation  Taken 10/14/2024 1400 by Penny Stanley RN  VTE Prevention/Management:   bilateral   SCDs (sequential compression devices) on  Taken 10/14/2024 1345 by Penny Stanley RN  VTE Prevention/Management:   bilateral   SCDs (sequential compression devices) on  Goal: Optimal Comfort and Wellbeing  Outcome: Progressing  Goal: Readiness for Transition of Care  Outcome: Progressing  Intervention: Mutually Develop Transition Plan  Recent Flowsheet Documentation  Taken 10/14/2024 1120 by Penny Stanley, KEON  Equipment Currently Used at Home: none   Goal Outcome Evaluation:  Plan of Care Reviewed With: patient        Progress: improving  Outcome Evaluation: pt now , uterus firm and 2 below the umbilicus. lochia scant to light. postpartum pitocin infusing.
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
## 2024-10-14 NOTE — ANESTHESIA PROCEDURE NOTES
Intrathecal Block      Performed By  CRNA/CAA: Tom Marquez CRNA  Intrathecal Block Prep:  Pt Position:sitting  Sterile Tech:cap, gloves, gown and mask  Prep:chloraprep  Intrathecal Block Procedure:  Approach:midline  Guidance:palpation technique  Location:L4-L5  Needle Type:Pencan  Needle Gauge:25 G  Placement of Needle Event: cerebrospinal fluid  Fluid Appearance:clear

## 2024-10-14 NOTE — L&D DELIVERY NOTE
SECTION REPEAT  Procedure Report    Patient Name:  Dani Trinidad  YOB: 2005    Date of Surgery:  10/14/2024     Pre-op Diagnosis:   G2,P1 at 31.2 weeks  Abdominal pain  Uterine dehiscence seen on ultrasound  Recurrent fetal heart rate decelerations    Post-op Diagnosis:  Same    Procedure:  Repeat  delivery  Bilateral salpingectomy    Staff:  Surgeon(s):  Quirino Padilla DO    Assistant: Sue Major RN     Anesthesia: Spinal    Antibiotics: Gentamicin, clindamycin    Drains: Crabtree catheter    Quantitative Blood Loss: 566cc       Delivery     Delivery: , Low Transverse    YOB: 2026   Time of Birth:  Gestational Age 12:58 PM  31w2d               Infant    Findings: male infant     Infant observations: Weight: 1660 g (3 lb 10.6 oz)  Length: 15 in  Observations/Comments:  HC- 29.5cm AGA 53.19%     Apgars: 8  @ 1 minute /    9  @ 5 minutes         Placenta, Cord, and Fluid    Placenta delivered  Manual removal at   10/14/2024  1:00 PM    Cord: 2 vessels present.   Nuchal Cord?  yes; Number of nuchal loops present:  1   Cord blood obtained: No   Cord gases obtained:  Yes             Operative Findings:  Uterus with prior bladder reflection scarred over lower uterine segment.  Prior hysterotomy not visualized.  Normal-appearing tubes and ovaries.    Complications  none    Indications:    Dani Trinidad is a 19 y.o. at 31.2 weeks with a known hysterotomy separation seen on ultrasound presented to labor and delivery with abdominal pain.  Shortly after presentation she began having recurrent fetal heart rate decelerations.  Given the constellation of symptoms, decision was made to proceed with delivery.    Description of Procedure:   After informed consent was obtained, the patient was taken to the operating room where  Spinal anesthesia was administered. A time out procedure was completed, confirming the correct patient and correct procedure. Perioperative  antibiotics were administered. She was placed in the supine position with leftward tilt and her abdomen was prepped and draped in normal sterile fashion after a Crabtree catheter was placed by nursing staff.     After confirming adequate anesthesia, a Pfannenstiel incision was made in the skin with the surgical scalpel through her prior scar.. Sharp dissection was carried out over the subsequent layers of tissue down to the fascia at midline. The fascial incision was extended laterally in both directions with fascial stretch. The rectus muscles were divided at midline and the peritoneum was then opened with blunt dissection at its upper margin. The peritoneum was then stretched by pulling caudally and cranially. All layers of the abdominal wall were manually stretched laterally to provide an opening as large as the skin incision.      The prior bladder flap was scarred over the lower uterine segment obscuring the location of the hysterotomy separation.  A bladder flap was not created. A low-transverse incision was made in the lower uterine segment above the scarred edge of the bladder using the scalpel. The uterine incision was extended bilaterally using blunt dissection in a cranial-caudad stretch. The amniotic sac was entered and the amniotic fluid was noted to be clear.     The surgeon’s hand was placed into the uterine cavity. The fetal head was identified, elevated into the abdomen and delivered through the uterine incision with the assistance of fundal pressure. The infant was examined for nuchal cord. A fairly loose nuchal cord was identified and reduced without excessive tension..    The infant was delivered with traction and the assistance of fundal pressure. The infant’s oral and nasal passages were bulb suctioned. The infant was vigorous on the field. Delayed cord clamping for 30 seconds was observed. Following delayed cord clamping, the cord was clamped and cut x2. The infant was then passed off the table  to the awaiting NICU team.  Umbilical artery and vein blood were collected for gas analysis. The placenta was expressed and removed intact.    Oxytocin was administered by IV infusion to enhance uterine contraction.  Uterus was exteriorized the uterus was cleared of all clots and remaining products of conception. The uterine incision was closed with #0-Vicryl in a running, locked fashion. A second imbricating stitch with a #0-Vicryl was used to obtain excellent hemostasis.     The RIGHT fallopian tube was then manually grasped and elevated.  Using the Enseal the mesosalpinx just beneath the tube was serially coagulated and transected.  The incision was carried through the mesosalpinx just beneath the fallopian tube to the cornual region.  The cornea was then doubly coagulated and transected excising the entire fallopian tube. Hemostasis was achieved without any further measures.  The LEFT fallopian tube was then addressed in a similar fashion.  Both ovaries were noted to be normal.    The hysterotomy was reexamined and noted to be hemostatic.  The pericolic gutters were cleared of all clots.  The uterus was returned to the abdomen.  The hysterotomy and adnexa were examined and again noted to be hemostatic.  The fascia was reapproximated using #0-Vicryl in a running non-locked fashion. The subcutaneous layer was reapproximated with #2-0 Vicryl with interrupted stitches.  The skin was reapproximated using #4-0 Monocryl. The incision was bandaged with Steri-Strips and pressure dressing.    All sponge, needle, and instrument counts were noted to be correct ×3 at the end of the procedure.     Implants:    Nothing was implanted during the procedure    Specimen:          Specimens       ID Source Type Tests Collected By Collected At [facility]?    A Fallopian Tube, Left Tissue TISSUE PATHOLOGY EXAM   Quirino Padilla, DO 10/14/24 1316     Description: LEFT FALLOPIAN TUBE    B Fallopian Tube, Right Tissue TISSUE PATHOLOGY  EXAM   Quirino Padilla DO 10/14/24 1316     Description: RIGHT FALLOPIAN TUBE         Disposition  Mother to Mother Baby/Postpartum  in stable condition currently.  Baby to NICU  in stable condition currently.    Quirino Padilla DO     Date: 10/14/2024  Time: 17:56 CDT

## 2024-10-15 LAB
BACTERIA SPEC AEROBE CULT: NO GROWTH
BASOPHILS # BLD AUTO: 0.02 10*3/MM3 (ref 0–0.2)
BASOPHILS NFR BLD AUTO: 0.3 % (ref 0–1.5)
DEPRECATED RDW RBC AUTO: 44.9 FL (ref 37–54)
EOSINOPHIL # BLD AUTO: 0.18 10*3/MM3 (ref 0–0.4)
EOSINOPHIL NFR BLD AUTO: 2.7 % (ref 0.3–6.2)
ERYTHROCYTE [DISTWIDTH] IN BLOOD BY AUTOMATED COUNT: 17.5 % (ref 12.3–15.4)
HCT VFR BLD AUTO: 30.1 % (ref 34–46.6)
HGB BLD-MCNC: 9.2 G/DL (ref 12–15.9)
IMM GRANULOCYTES # BLD AUTO: 0.04 10*3/MM3 (ref 0–0.05)
IMM GRANULOCYTES NFR BLD AUTO: 0.6 % (ref 0–0.5)
LYMPHOCYTES # BLD AUTO: 1.45 10*3/MM3 (ref 0.7–3.1)
LYMPHOCYTES NFR BLD AUTO: 21.6 % (ref 19.6–45.3)
MCH RBC QN AUTO: 27 PG (ref 26.6–33)
MCHC RBC AUTO-ENTMCNC: 30.6 G/DL (ref 31.5–35.7)
MCV RBC AUTO: 88.3 FL (ref 79–97)
MONOCYTES # BLD AUTO: 0.45 10*3/MM3 (ref 0.1–0.9)
MONOCYTES NFR BLD AUTO: 6.7 % (ref 5–12)
NEUTROPHILS NFR BLD AUTO: 4.56 10*3/MM3 (ref 1.7–7)
NEUTROPHILS NFR BLD AUTO: 68.1 % (ref 42.7–76)
NRBC BLD AUTO-RTO: 0 /100 WBC (ref 0–0.2)
PLATELET # BLD AUTO: 241 10*3/MM3 (ref 140–450)
PMV BLD AUTO: 11.2 FL (ref 6–12)
RBC # BLD AUTO: 3.41 10*6/MM3 (ref 3.77–5.28)
WBC NRBC COR # BLD AUTO: 6.7 10*3/MM3 (ref 3.4–10.8)

## 2024-10-15 PROCEDURE — 25010000002 KETOROLAC TROMETHAMINE PER 15 MG: Performed by: OBSTETRICS & GYNECOLOGY

## 2024-10-15 PROCEDURE — 25010000002 ONDANSETRON PER 1 MG: Performed by: OBSTETRICS & GYNECOLOGY

## 2024-10-15 PROCEDURE — 85025 COMPLETE CBC W/AUTO DIFF WBC: CPT | Performed by: OBSTETRICS & GYNECOLOGY

## 2024-10-15 RX ORDER — ACETAMINOPHEN 325 MG/1
650 TABLET ORAL EVERY 6 HOURS
Status: DISCONTINUED | OUTPATIENT
Start: 2024-10-15 | End: 2024-10-17 | Stop reason: HOSPADM

## 2024-10-15 RX ORDER — ACETAMINOPHEN 500 MG
1000 TABLET ORAL EVERY 6 HOURS
Status: COMPLETED | OUTPATIENT
Start: 2024-10-15 | End: 2024-10-15

## 2024-10-15 RX ADMIN — RISPERIDONE 1 MG: 1 SOLUTION ORAL at 22:47

## 2024-10-15 RX ADMIN — LEVETIRACETAM 500 MG: 500 TABLET, FILM COATED ORAL at 22:47

## 2024-10-15 RX ADMIN — Medication 10 ML: at 09:35

## 2024-10-15 RX ADMIN — RISPERIDONE 1 MG: 1 SOLUTION ORAL at 09:33

## 2024-10-15 RX ADMIN — ACETAMINOPHEN 1000 MG: 500 TABLET, FILM COATED ORAL at 00:29

## 2024-10-15 RX ADMIN — OXYCODONE HYDROCHLORIDE 10 MG: 5 TABLET ORAL at 22:47

## 2024-10-15 RX ADMIN — ONDANSETRON 4 MG: 2 INJECTION INTRAMUSCULAR; INTRAVENOUS at 09:35

## 2024-10-15 RX ADMIN — ACETAMINOPHEN 1000 MG: 500 TABLET, FILM COATED ORAL at 14:49

## 2024-10-15 RX ADMIN — PRENATAL VIT W/ FE FUMARATE-FA TAB 27-0.8 MG 1 TABLET: 27-0.8 TAB at 09:33

## 2024-10-15 RX ADMIN — IBUPROFEN 600 MG: 600 TABLET, FILM COATED ORAL at 22:47

## 2024-10-15 RX ADMIN — ACETAMINOPHEN 1000 MG: 500 TABLET, FILM COATED ORAL at 09:33

## 2024-10-15 RX ADMIN — KETOROLAC TROMETHAMINE 15 MG: 15 INJECTION, SOLUTION INTRAMUSCULAR; INTRAVENOUS at 05:22

## 2024-10-15 RX ADMIN — LEVETIRACETAM 500 MG: 500 TABLET, FILM COATED ORAL at 09:54

## 2024-10-15 RX ADMIN — OXYCODONE HYDROCHLORIDE 5 MG: 5 TABLET ORAL at 14:50

## 2024-10-15 RX ADMIN — KETOROLAC TROMETHAMINE 15 MG: 15 INJECTION, SOLUTION INTRAMUSCULAR; INTRAVENOUS at 16:32

## 2024-10-15 NOTE — PLAN OF CARE
Problem: Adult Inpatient Plan of Care  Goal: Plan of Care Review  Outcome: Progressing  Flowsheets (Taken 10/15/2024 6393)  Progress: no change  Outcome Evaluation: VSS. Fundus firm, ML 1 under umbilicus. Voiding. Ambulating to NICU to visit baby. Pt rested well. S/O at bs. C/o pain. Prn given with good relief noted.  Plan of Care Reviewed With: patient   Goal Outcome Evaluation:  Plan of Care Reviewed With: patient        Progress: no change  Outcome Evaluation: VSS. Fundus firm, ML 1 under umbilicus. Voiding. Ambulating to NICU to visit baby. Pt rested well. S/O at bs. C/o pain. Prn given with good relief noted.

## 2024-10-15 NOTE — PLAN OF CARE
Goal Outcome Evaluation:  Plan of Care Reviewed With: patient           Outcome Evaluation: VSS. Fundus and lochia WDL. Ambulating. Crabtree output adequate, removed per order, DTV. Pt reports passing flatus. Pain controlled effectively per pt with ERAS protocol. Pt cooperative, reasonable, and appropriate this shift; pt did refuse labs and assessment this morning because she was sleeping, but agreed to let RN know when she was ready for labs and assessment. Significant other at bedside and has been cooperative and appropriate.

## 2024-10-15 NOTE — ANESTHESIA POSTPROCEDURE EVALUATION
Patient: Dani Trinidad    Procedure Summary       Date: 10/14/24 Room / Location: [facility] LABOR DELIVERY 2 /  [facility] LABOR DELIVERY    Anesthesia Start: 1238 Anesthesia Stop: 1341    Procedure:  SECTION REPEAT (Abdomen) Diagnosis:     Surgeons: Quirino Padilla DO Provider: Tom Marquez CRNA    Anesthesia Type: spinal ASA Status: 3 - Emergent            Anesthesia Type: spinal    Vitals  Vitals Value Taken Time   /66 10/15/24 0730   Temp 98.2 °F (36.8 °C) 10/15/24 0730   Pulse 82 10/15/24 0730   Resp 16 10/15/24 0730   SpO2 97 % 10/15/24 0730           Post Anesthesia Care and Evaluation    Patient location during evaluation: PHASE II  Patient participation: complete - patient participated  Level of consciousness: awake and alert  Pain score: 0  Pain management: adequate    Airway patency: patent  Anesthetic complications: No anesthetic complications  PONV Status: none  Cardiovascular status: acceptable  Respiratory status: acceptable  Hydration status: acceptable  Post Neuraxial Block status: Motor and sensory function returned to baseline and No signs or symptoms of PDPHNo anesthesia care post op

## 2024-10-15 NOTE — NURSING NOTE
"RN went to assess pt and to assist phlebotomist with drawing pt labs. Upon entering room, pt was sleeping. RN attempted to wake pt up but pt jerked arm away and said \"no\". RN explained to pt about the need to assess pt and for labs to be drawn. Pt stated \"later\". RN told pt to call the nurse when she was ready for her assessment and for labs to be drawn. Pt verbalized understanding.  "

## 2024-10-15 NOTE — PROGRESS NOTES
Postpartum day 1  Pt is postop day 1, tolerating po fluids, ambulating, bladder functio jorgito;  Vitals noted  Labs pending  Exam ,  unremarkable  A: stable   Plan cbc pending

## 2024-10-16 LAB
CYTO UR: NORMAL
LAB AP CASE REPORT: NORMAL
Lab: NORMAL
PATH REPORT.FINAL DX SPEC: NORMAL
PATH REPORT.GROSS SPEC: NORMAL

## 2024-10-16 RX ADMIN — ACETAMINOPHEN 650 MG: 325 TABLET ORAL at 08:39

## 2024-10-16 RX ADMIN — ACETAMINOPHEN 650 MG: 325 TABLET ORAL at 20:57

## 2024-10-16 RX ADMIN — OXYCODONE HYDROCHLORIDE 10 MG: 5 TABLET ORAL at 13:27

## 2024-10-16 RX ADMIN — OXYCODONE HYDROCHLORIDE 10 MG: 5 TABLET ORAL at 20:57

## 2024-10-16 RX ADMIN — RISPERIDONE 1 MG: 1 SOLUTION ORAL at 20:57

## 2024-10-16 RX ADMIN — PRENATAL VIT W/ FE FUMARATE-FA TAB 27-0.8 MG 1 TABLET: 27-0.8 TAB at 08:39

## 2024-10-16 RX ADMIN — RISPERIDONE 1 MG: 1 SOLUTION ORAL at 08:39

## 2024-10-16 RX ADMIN — IBUPROFEN 600 MG: 600 TABLET, FILM COATED ORAL at 17:31

## 2024-10-16 RX ADMIN — LEVETIRACETAM 500 MG: 500 TABLET, FILM COATED ORAL at 20:57

## 2024-10-16 RX ADMIN — OXYCODONE HYDROCHLORIDE 5 MG: 5 TABLET ORAL at 08:40

## 2024-10-16 RX ADMIN — ACETAMINOPHEN 650 MG: 325 TABLET ORAL at 13:27

## 2024-10-16 RX ADMIN — IBUPROFEN 600 MG: 600 TABLET, FILM COATED ORAL at 23:56

## 2024-10-16 RX ADMIN — LEVETIRACETAM 500 MG: 500 TABLET, FILM COATED ORAL at 12:02

## 2024-10-16 NOTE — PROGRESS NOTES
Postop day 2  Denies any problems,   Pt declined iv iron  Vitals noted  Labs noted  Exam unremarkable  A: stable c/s day 2  Plan to discharge home in am

## 2024-10-16 NOTE — PLAN OF CARE
Problem: Adult Inpatient Plan of Care  Goal: Plan of Care Review  Outcome: Progressing  Flowsheets (Taken 10/16/2024 4173)  Progress: no change  Outcome Evaluation: VSS. Pt refused fundus check and assessment. Pt reported scant bleeding. Voiding and ambulating. PRN given x2 this shift. Pt encouraged to follow around the close pain medication schedule for best results. Pt showered and drsg removed. No drainage.  Plan of Care Reviewed With:   patient   significant other   Goal Outcome Evaluation:  Plan of Care Reviewed With: patient, significant other        Progress: no change  Outcome Evaluation: VSS. Pt refused fundus check and assessment. Pt reported scant bleeding. Voiding and ambulating. PRN given x2 this shift. Pt encouraged to follow around the close pain medication schedule for best results. Pt showered and drsg removed. No drainage.

## 2024-10-16 NOTE — PLAN OF CARE
Goal Outcome Evaluation:  Plan of Care Reviewed With: patient, significant other        Progress: no change  Outcome Evaluation: VSS. Pt refused for me to check her fundus, I was able to look at her perineal pad and it had scant lochia. ALT w/ pressure dressing in place, C/D/I. Encouraged patient to take shower to help take dressing off. Ambulating and voiding. Patient has slept most of the shift, Roxicodone 10 given x1 due to pain 9-10/10 and motrin given x1. Patient has not visited NICU during this shift.

## 2024-10-17 VITALS
HEIGHT: 66 IN | BODY MASS INDEX: 29.38 KG/M2 | DIASTOLIC BLOOD PRESSURE: 82 MMHG | RESPIRATION RATE: 16 BRPM | OXYGEN SATURATION: 98 % | HEART RATE: 112 BPM | SYSTOLIC BLOOD PRESSURE: 124 MMHG | TEMPERATURE: 98.2 F

## 2024-10-17 PROBLEM — O36.8390 FETAL HEART RATE DECELERATIONS AFFECTING MANAGEMENT OF MOTHER: Status: RESOLVED | Noted: 2024-10-14 | Resolved: 2024-10-17

## 2024-10-17 PROBLEM — Z37.9 NORMAL LABOR: Status: RESOLVED | Noted: 2022-07-02 | Resolved: 2024-10-17

## 2024-10-17 PROBLEM — O99.350 SEIZURE DISORDER DURING PREGNANCY: Status: RESOLVED | Noted: 2024-10-03 | Resolved: 2024-10-17

## 2024-10-17 PROBLEM — Z34.90 PREGNANCY: Status: RESOLVED | Noted: 2024-09-30 | Resolved: 2024-10-17

## 2024-10-17 PROBLEM — O34.219 PREGNANCY WITH HISTORY OF CESAREAN SECTION, ANTEPARTUM: Status: RESOLVED | Noted: 2024-09-29 | Resolved: 2024-10-17

## 2024-10-17 PROBLEM — O71.03: Status: RESOLVED | Noted: 2024-09-29 | Resolved: 2024-10-17

## 2024-10-17 PROBLEM — F31.9 BIPOLAR DISEASE DURING PREGNANCY IN THIRD TRIMESTER: Status: RESOLVED | Noted: 2024-10-03 | Resolved: 2024-10-17

## 2024-10-17 PROBLEM — O47.00 THREATENED PRETERM LABOR: Status: RESOLVED | Noted: 2024-10-01 | Resolved: 2024-10-17

## 2024-10-17 PROBLEM — O99.343 BIPOLAR DISEASE DURING PREGNANCY IN THIRD TRIMESTER: Status: RESOLVED | Noted: 2024-10-03 | Resolved: 2024-10-17

## 2024-10-17 PROBLEM — Z3A.31 31 WEEKS GESTATION OF PREGNANCY: Status: RESOLVED | Noted: 2024-10-14 | Resolved: 2024-10-17

## 2024-10-17 PROBLEM — O09.33 NO PRENATAL CARE IN CURRENT PREGNANCY IN THIRD TRIMESTER: Status: RESOLVED | Noted: 2024-09-29 | Resolved: 2024-10-17

## 2024-10-17 PROBLEM — O26.893 ABDOMINAL PAIN DURING PREGNANCY IN THIRD TRIMESTER: Status: RESOLVED | Noted: 2024-10-14 | Resolved: 2024-10-17

## 2024-10-17 PROBLEM — N39.0 SEPSIS DUE TO GRAM-NEGATIVE UTI: Status: RESOLVED | Noted: 2023-06-09 | Resolved: 2024-10-17

## 2024-10-17 PROBLEM — O99.820 GBS (GROUP B STREPTOCOCCUS CARRIER), +RV CULTURE, CURRENTLY PREGNANT: Status: RESOLVED | Noted: 2024-10-03 | Resolved: 2024-10-17

## 2024-10-17 PROBLEM — N12 PYELONEPHRITIS: Status: RESOLVED | Noted: 2023-06-08 | Resolved: 2024-10-17

## 2024-10-17 PROBLEM — G40.909 SEIZURE DISORDER DURING PREGNANCY: Status: RESOLVED | Noted: 2024-10-03 | Resolved: 2024-10-17

## 2024-10-17 PROBLEM — R10.9 ABDOMINAL PAIN DURING PREGNANCY IN THIRD TRIMESTER: Status: RESOLVED | Noted: 2024-10-14 | Resolved: 2024-10-17

## 2024-10-17 PROBLEM — A41.50 SEPSIS DUE TO GRAM-NEGATIVE UTI: Status: RESOLVED | Noted: 2023-06-09 | Resolved: 2024-10-17

## 2024-10-17 PROBLEM — O9A.413: Status: RESOLVED | Noted: 2024-09-29 | Resolved: 2024-10-17

## 2024-10-17 PROBLEM — O71.00 DEHISCENCE (WITHOUT EXTENSION) OF OLD UTERINE SCAR BEFORE ONSET OF LABOR, ANTEPARTUM CONDITION OR COMPLICATION: Status: RESOLVED | Noted: 2024-10-04 | Resolved: 2024-10-17

## 2024-10-17 RX ORDER — IBUPROFEN 600 MG/1
600 TABLET, FILM COATED ORAL EVERY 6 HOURS PRN
Qty: 60 TABLET | Refills: 2 | Status: SHIPPED | OUTPATIENT
Start: 2024-10-17

## 2024-10-17 RX ORDER — LEVETIRACETAM 500 MG/1
500 TABLET ORAL EVERY 12 HOURS SCHEDULED
Qty: 60 TABLET | Refills: 2 | Status: SHIPPED | OUTPATIENT
Start: 2024-10-17

## 2024-10-17 RX ORDER — OXYCODONE HYDROCHLORIDE 5 MG/1
5 TABLET ORAL EVERY 4 HOURS PRN
Qty: 15 TABLET | Refills: 0 | Status: SHIPPED | OUTPATIENT
Start: 2024-10-17 | End: 2024-10-19

## 2024-10-17 RX ADMIN — PRENATAL VIT W/ FE FUMARATE-FA TAB 27-0.8 MG 1 TABLET: 27-0.8 TAB at 09:35

## 2024-10-17 RX ADMIN — IBUPROFEN 600 MG: 600 TABLET, FILM COATED ORAL at 05:44

## 2024-10-17 RX ADMIN — OXYCODONE HYDROCHLORIDE 10 MG: 5 TABLET ORAL at 05:43

## 2024-10-17 RX ADMIN — RISPERIDONE 1 MG: 1 SOLUTION ORAL at 09:35

## 2024-10-17 RX ADMIN — LEVETIRACETAM 500 MG: 500 TABLET, FILM COATED ORAL at 09:35

## 2024-10-17 RX ADMIN — ACETAMINOPHEN 650 MG: 325 TABLET ORAL at 09:35

## 2024-10-17 NOTE — PROGRESS NOTES
"[facility]   Section Postpartum Delivery Progress Note    Subjective   Postpartum Day 3:  Delivery    The patient feels well.  Her pain is well controlled with prescribed pain medications.   She is ambulating well.  Patient describes her bleeding as staining only.    Breastfeeding: has not attempted yet.    Objective     Vital Signs Range for the last 24 hours  Temperature: Temp:  [98.7 °F (37.1 °C)] 98.7 °F (37.1 °C)   Temp Source: Temp src: Temporal   BP: BP: (108)/(79) 108/79   Pulse: Heart Rate:  [117] 117   Respirations: Resp:  [16] 16   SPO2: SpO2:  [98 %] 98 %   O2 Amount(L/min):     O2 Devices Device (Oxygen Therapy): room air   Weight:       Admit Height:  Height: 167.6 cm (66\")      Physical Exam:  General:  no acute distresss.  Abdomen: Fundus: appropriate, firm, non tender  Extremities: normal, atraumatic, no cyanosis, and trace edema.   Incision: intact    Lab results reviewed:  Yes   Rubella:  No results found for: \"RUBELLAIGGIN\" Nurse Transcribed from prenatal record --  No components found for: \"EXTRUBELQUAL\"  Rh Status:    RH type   Date Value Ref Range Status   10/14/2024 Positive  Final     Immunizations:   Immunization History   Administered Date(s) Administered    DTaP 2003, 2004, 2005, 2006, 2007    Flu Vaccine Quad PF 6-35MO 2017, 10/05/2018    Fluzone (or Fluarix & Flulaval for VFC) >6mos 2019    HPV Bivalent 2018    Hepatitis A 2017, 2018    Hepatitis B Adult/Adolescent IM 2003, 2004, 2005    HiB 2003, 2004, 2005, 2006    Hpv9 2019    IPV 2003, 2004, 2005, 2007    MMR 2005, 2007    Meningococcal Conjugate 2017    Meningococcal MCV4P (Menactra) 2019    PEDS-Pneumococcal Conjugate (PCV7) 2003, 2004, 2006, 2007    Tdap 2017    Varicella 2004, 2007       Assessment & Plan       " Abdominal pain during pregnancy in third trimester    Dehiscence (without extension) of old uterine scar before onset of labor, antepartum condition or complication, third trimester    No prenatal care in current pregnancy in third trimester    Pregnancy with history of  section, antepartum    Pregnancy    GBS (group B Streptococcus carrier), +RV culture, currently pregnant    Dehiscence (without extension) of old uterine scar before onset of labor, antepartum condition or complication    31 weeks gestation of pregnancy    Fetal heart rate decelerations affecting management of mother      Dani STEVIE Trinidad is Day 3  post-partum  , Low Transverse  .      Plan:  Discharge home with standard precautions and return to clinic in 4-6 weeks.      Rajani Singleton MD  10/17/2024  08:20 CDT

## 2024-10-17 NOTE — DISCHARGE SUMMARY
Discharge Summary     Omkar Trinidad  : 2005  MRN: [id number]  CSN: [id number]    Date of Admission: 10/14/2024   Date of Discharge:  10/17/2024   Delivering Physician: Quirino Padilla       Admission Diagnosis: Pregnancy [Z34.90]  Dehiscence (without extension) of old uterine scar before onset of labor, antepartum condition or complication [O71.00]   Discharge Diagnosis: Pregnancy at 31w2d - delivered       Procedures: 10/14/2024 - , Low Transverse      Hospital Course  Patient is a 19 y.o.  who at 31w2d had a  section.  Her postoperative course was without complications.  On POD #2 she was ready for discharge.  She had normal lochia and pain well controlled with oral medications.    Infant  male fetus weighing 1660 g (3 lb 10.6 oz)  Apgars -  8 @ 1 minute /  9 @ 5 minutes.    Discharge labs  Lab Results   Component Value Date    WBC 6.70 10/15/2024    HGB 9.2 (L) 10/15/2024    HCT 30.1 (L) 10/15/2024     10/15/2024       Discharge Medications     Discharge Medications        New Medications        Instructions Start Date   ibuprofen 600 MG tablet  Commonly known as: ADVIL,MOTRIN   600 mg, Oral, Every 6 Hours PRN      levETIRAcetam 500 MG tablet  Commonly known as: KEPPRA   500 mg, Oral, Every 12 Hours Scheduled      oxyCODONE 5 MG immediate release tablet  Commonly known as: ROXICODONE   5 mg, Oral, Every 4 Hours PRN             Continue These Medications        Instructions Start Date   prenatal (CLASSIC) vitamin 28-0.8 MG tablet tablet  Generic drug: prenatal vitamin   1 tablet, Daily               Discharge Disposition Home or Self Care   Condition on Discharge: good   Follow-up: 2 weeks with [facility] NEAL Singleton MD  10/17/2024

## 2024-10-17 NOTE — NURSING NOTE
Patient has been given discharge instructions.  Patient reminded that when she comes back to the NICU to visit infant, that the NICU is a quite area and there is no disruptive behavior that will be tolerated.  Patient was told that infants in NICU must have quite environment for their recovery.  Patient and her significant other state that they understand.

## 2024-10-22 ENCOUNTER — HOSPITAL ENCOUNTER (EMERGENCY)
Age: 19
Discharge: LEFT BEFORE DISCHARGE | End: 2024-10-22
Attending: EMERGENCY MEDICINE
Payer: COMMERCIAL

## 2024-10-22 ENCOUNTER — APPOINTMENT (OUTPATIENT)
Dept: CT IMAGING | Facility: HOSPITAL | Age: 19
End: 2024-10-22
Payer: COMMERCIAL

## 2024-10-22 ENCOUNTER — HOSPITAL ENCOUNTER (EMERGENCY)
Facility: HOSPITAL | Age: 19
Discharge: LEFT BEFORE DISCHARGE | End: 2024-10-22
Admitting: EMERGENCY MEDICINE
Payer: COMMERCIAL

## 2024-10-22 VITALS
OXYGEN SATURATION: 97 % | RESPIRATION RATE: 20 BRPM | TEMPERATURE: 97.6 F | SYSTOLIC BLOOD PRESSURE: 112 MMHG | BODY MASS INDEX: 23.78 KG/M2 | DIASTOLIC BLOOD PRESSURE: 69 MMHG | HEART RATE: 81 BPM | WEIGHT: 148 LBS | HEIGHT: 66 IN

## 2024-10-22 VITALS
SYSTOLIC BLOOD PRESSURE: 111 MMHG | HEART RATE: 115 BPM | DIASTOLIC BLOOD PRESSURE: 85 MMHG | OXYGEN SATURATION: 95 % | TEMPERATURE: 97.5 F | RESPIRATION RATE: 18 BRPM

## 2024-10-22 DIAGNOSIS — Z53.29 LEFT AGAINST MEDICAL ADVICE: Primary | ICD-10-CM

## 2024-10-22 DIAGNOSIS — N93.9 VAGINAL BLEEDING: Primary | ICD-10-CM

## 2024-10-22 DIAGNOSIS — R11.0 NAUSEA: ICD-10-CM

## 2024-10-22 DIAGNOSIS — T81.49XA INCISIONAL ABSCESS: ICD-10-CM

## 2024-10-22 DIAGNOSIS — Z98.891 S/P CESAREAN SECTION: ICD-10-CM

## 2024-10-22 LAB
ALBUMIN SERPL-MCNC: 3.8 G/DL (ref 3.5–5.2)
ALBUMIN SERPL-MCNC: 3.8 G/DL (ref 3.5–5.2)
ALBUMIN/GLOB SERPL: 1.2 G/DL
ALP SERPL-CCNC: 83 U/L (ref 35–104)
ALP SERPL-CCNC: 86 U/L (ref 39–117)
ALT SERPL W P-5'-P-CCNC: 15 U/L (ref 1–33)
ALT SERPL-CCNC: 15 U/L (ref 5–33)
ANION GAP SERPL CALCULATED.3IONS-SCNC: 10 MMOL/L (ref 5–15)
ANION GAP SERPL CALCULATED.3IONS-SCNC: 12 MMOL/L (ref 7–19)
AST SERPL-CCNC: 12 U/L (ref 1–32)
AST SERPL-CCNC: 23 U/L (ref 5–32)
BASOPHILS # BLD AUTO: 0.05 10*3/MM3 (ref 0–0.2)
BASOPHILS # BLD: 0.1 K/UL (ref 0–0.2)
BASOPHILS NFR BLD AUTO: 0.9 % (ref 0–1.5)
BASOPHILS NFR BLD: 1.1 % (ref 0–1)
BILIRUB SERPL-MCNC: 0.5 MG/DL (ref 0.2–1.2)
BILIRUB SERPL-MCNC: 0.5 MG/DL (ref 0–1.2)
BUN SERPL-MCNC: 12 MG/DL (ref 6–20)
BUN SERPL-MCNC: 12 MG/DL (ref 6–20)
BUN/CREAT SERPL: 28.6 (ref 7–25)
CALCIUM SERPL-MCNC: 8.9 MG/DL (ref 8.6–10)
CALCIUM SPEC-SCNC: 8.6 MG/DL (ref 8.6–10.5)
CANNABINOIDS UR QL CFM: NORMAL
CARBOXYTHC/CREAT UR: 371 NG/MG CREAT
CHLORIDE SERPL-SCNC: 104 MMOL/L (ref 98–111)
CHLORIDE SERPL-SCNC: 105 MMOL/L (ref 98–107)
CO2 SERPL-SCNC: 23 MMOL/L (ref 22–29)
CO2 SERPL-SCNC: 26 MMOL/L (ref 22–29)
CREAT SERPL-MCNC: 0.4 MG/DL (ref 0.5–0.9)
CREAT SERPL-MCNC: 0.42 MG/DL (ref 0.57–1)
DEPRECATED RDW RBC AUTO: 52.9 FL (ref 37–54)
EGFRCR SERPLBLD CKD-EPI 2021: 142.9 ML/MIN/1.73
EOSINOPHIL # BLD AUTO: 0.26 10*3/MM3 (ref 0–0.4)
EOSINOPHIL # BLD: 0.3 K/UL (ref 0–0.6)
EOSINOPHIL NFR BLD AUTO: 4.6 % (ref 0.3–6.2)
EOSINOPHIL NFR BLD: 4.8 % (ref 0–5)
ERYTHROCYTE [DISTWIDTH] IN BLOOD BY AUTOMATED COUNT: 17.2 % (ref 11.5–14.5)
ERYTHROCYTE [DISTWIDTH] IN BLOOD BY AUTOMATED COUNT: 17.2 % (ref 12.3–15.4)
GLOBULIN UR ELPH-MCNC: 3.3 GM/DL
GLUCOSE SERPL-MCNC: 87 MG/DL (ref 65–99)
GLUCOSE SERPL-MCNC: 88 MG/DL (ref 70–99)
HCT VFR BLD AUTO: 36.1 % (ref 34–46.6)
HCT VFR BLD AUTO: 37.1 % (ref 37–47)
HGB BLD-MCNC: 11 G/DL (ref 12–15.9)
HGB BLD-MCNC: 11.7 G/DL (ref 12–16)
IMM GRANULOCYTES # BLD AUTO: 0.01 10*3/MM3 (ref 0–0.05)
IMM GRANULOCYTES # BLD: 0 K/UL
IMM GRANULOCYTES NFR BLD AUTO: 0.2 % (ref 0–0.5)
LEVEL OF DETECTION:: NORMAL
LIPASE SERPL-CCNC: 17 U/L (ref 13–60)
LYMPHOCYTES # BLD AUTO: 2.2 10*3/MM3 (ref 0.7–3.1)
LYMPHOCYTES # BLD: 2.5 K/UL (ref 1.1–4.5)
LYMPHOCYTES NFR BLD AUTO: 39.3 % (ref 19.6–45.3)
LYMPHOCYTES NFR BLD: 39.3 % (ref 20–40)
MCH RBC QN AUTO: 26.4 PG (ref 26.6–33)
MCH RBC QN AUTO: 27.6 PG (ref 27–31)
MCHC RBC AUTO-ENTMCNC: 30.5 G/DL (ref 31.5–35.7)
MCHC RBC AUTO-ENTMCNC: 31.5 G/DL (ref 33–37)
MCV RBC AUTO: 86.6 FL (ref 79–97)
MCV RBC AUTO: 87.5 FL (ref 81–99)
MONOCYTES # BLD AUTO: 0.42 10*3/MM3 (ref 0.1–0.9)
MONOCYTES # BLD: 0.4 K/UL (ref 0–0.9)
MONOCYTES NFR BLD AUTO: 7.5 % (ref 5–12)
MONOCYTES NFR BLD: 6.8 % (ref 0–10)
NEUTROPHILS # BLD: 3.1 K/UL (ref 1.5–7.5)
NEUTROPHILS NFR BLD AUTO: 2.66 10*3/MM3 (ref 1.7–7)
NEUTROPHILS NFR BLD AUTO: 47.5 % (ref 42.7–76)
NEUTS SEG NFR BLD: 47.8 % (ref 50–65)
NRBC BLD AUTO-RTO: 0 /100 WBC (ref 0–0.2)
PLATELET # BLD AUTO: 368 10*3/MM3 (ref 140–450)
PLATELET # BLD AUTO: 405 K/UL (ref 130–400)
PMV BLD AUTO: 10.2 FL (ref 6–12)
PMV BLD AUTO: 10.5 FL (ref 9.4–12.3)
POTASSIUM SERPL-SCNC: 3.4 MMOL/L (ref 3.5–5.2)
POTASSIUM SERPL-SCNC: 3.9 MMOL/L (ref 3.5–5)
PROCALCITONIN SERPL-MCNC: 0.03 NG/ML (ref 0–0.25)
PROT SERPL-MCNC: 7.1 G/DL (ref 6–8.5)
PROT SERPL-MCNC: 7.2 G/DL (ref 6.4–8.3)
RBC # BLD AUTO: 4.17 10*6/MM3 (ref 3.77–5.28)
RBC # BLD AUTO: 4.24 M/UL (ref 4.2–5.4)
SODIUM SERPL-SCNC: 139 MMOL/L (ref 136–145)
SODIUM SERPL-SCNC: 141 MMOL/L (ref 136–145)
WBC # BLD AUTO: 6.4 K/UL (ref 4.8–10.8)
WBC NRBC COR # BLD AUTO: 5.6 10*3/MM3 (ref 3.4–10.8)

## 2024-10-22 PROCEDURE — 80053 COMPREHEN METABOLIC PANEL: CPT

## 2024-10-22 PROCEDURE — 84145 PROCALCITONIN (PCT): CPT | Performed by: PHYSICIAN ASSISTANT

## 2024-10-22 PROCEDURE — 99283 EMERGENCY DEPT VISIT LOW MDM: CPT

## 2024-10-22 PROCEDURE — 85025 COMPLETE CBC W/AUTO DIFF WBC: CPT

## 2024-10-22 PROCEDURE — 80053 COMPREHEN METABOLIC PANEL: CPT | Performed by: PHYSICIAN ASSISTANT

## 2024-10-22 PROCEDURE — 83690 ASSAY OF LIPASE: CPT | Performed by: PHYSICIAN ASSISTANT

## 2024-10-22 PROCEDURE — 85025 COMPLETE CBC W/AUTO DIFF WBC: CPT | Performed by: PHYSICIAN ASSISTANT

## 2024-10-22 PROCEDURE — 36415 COLL VENOUS BLD VENIPUNCTURE: CPT

## 2024-10-22 PROCEDURE — 25010000002 ONDANSETRON PER 1 MG: Performed by: PHYSICIAN ASSISTANT

## 2024-10-22 PROCEDURE — 25510000001 IOPAMIDOL 61 % SOLUTION: Performed by: PHYSICIAN ASSISTANT

## 2024-10-22 PROCEDURE — 96374 THER/PROPH/DIAG INJ IV PUSH: CPT

## 2024-10-22 PROCEDURE — 74177 CT ABD & PELVIS W/CONTRAST: CPT

## 2024-10-22 PROCEDURE — 99285 EMERGENCY DEPT VISIT HI MDM: CPT

## 2024-10-22 RX ORDER — CLINDAMYCIN PHOSPHATE 600 MG/50ML
600 INJECTION, SOLUTION INTRAVENOUS ONCE
Status: DISCONTINUED | OUTPATIENT
Start: 2024-10-22 | End: 2024-10-22 | Stop reason: HOSPADM

## 2024-10-22 RX ORDER — CLINDAMYCIN HCL 300 MG
300 CAPSULE ORAL 4 TIMES DAILY
Qty: 40 CAPSULE | Refills: 0 | Status: SHIPPED | OUTPATIENT
Start: 2024-10-22 | End: 2024-11-01

## 2024-10-22 RX ORDER — IOPAMIDOL 612 MG/ML
100 INJECTION, SOLUTION INTRAVASCULAR
Status: COMPLETED | OUTPATIENT
Start: 2024-10-22 | End: 2024-10-22

## 2024-10-22 RX ORDER — ONDANSETRON 2 MG/ML
4 INJECTION INTRAMUSCULAR; INTRAVENOUS ONCE
Status: COMPLETED | OUTPATIENT
Start: 2024-10-22 | End: 2024-10-22

## 2024-10-22 RX ORDER — SODIUM CHLORIDE 0.9 % (FLUSH) 0.9 %
10 SYRINGE (ML) INJECTION AS NEEDED
Status: DISCONTINUED | OUTPATIENT
Start: 2024-10-22 | End: 2024-10-22 | Stop reason: HOSPADM

## 2024-10-22 RX ORDER — ONDANSETRON 4 MG/1
4 TABLET, ORALLY DISINTEGRATING ORAL EVERY 6 HOURS PRN
Qty: 12 TABLET | Refills: 0 | Status: SHIPPED | OUTPATIENT
Start: 2024-10-22

## 2024-10-22 RX ADMIN — ONDANSETRON 4 MG: 2 INJECTION INTRAMUSCULAR; INTRAVENOUS at 14:50

## 2024-10-22 RX ADMIN — IOPAMIDOL 100 ML: 612 INJECTION, SOLUTION INTRAVENOUS at 15:31

## 2024-10-22 NOTE — ED PROVIDER NOTES
Subjective   History of Present Illness    Patient is a 19-year-old female presenting to ED with postoperative bleeding and drainage.  PMH significant for ADD, fetal alcohol spectrum disorder, bipolar, history gestational diabetes, recent  and salpingectomy 10/14/2024.  G2, .  Patient states 8 days ago she had a  and bilateral salpingectomy performed at 31 weeks for which send she has had slight opening on the right side of her incision with intermittent white stringy discharge as well as bleeding.  Patient states that today she then began having vaginal bleeding as well as a malodorous discharge for which she became concerned and presents to the ER.  Patient states that over the past 5 days she has had increasing nausea, vomiting and inability to tolerate anything by mouth.  Patient was sent home with a prescription for pain medicine which she chose not to take due to side effects however she was not sent home with any nausea medicines.  Patient denies fevers, chills, or diaphoresis.  Patient states that they are currently staying in a parent room as the patient is in the NICU and deny any known recent sick contacts.  Patient states that due to this she has had limited access to food but every time she tries to eat she becomes nauseous and throws up.    Records reviewed show patient was most recently seen by OB/GYN on 10/14/2024 at 31 weeks gestation pregnancy for repeat  with no outpatient follow-up yet.    Patient's first outpatient OB/GYN follow-up on postpartum 10/29/2024.    Review of Systems   Constitutional: Negative.  Negative for chills, diaphoresis and fever.   HENT: Negative.     Eyes: Negative.    Respiratory: Negative.     Cardiovascular: Negative.    Gastrointestinal:  Positive for abdominal pain (Along  surgical incision), nausea and vomiting. Negative for constipation and diarrhea.   Genitourinary:  Positive for vaginal bleeding and vaginal discharge.  Negative for dysuria, flank pain and hematuria.   Musculoskeletal: Negative.    Skin: Negative.    Neurological: Negative.    Psychiatric/Behavioral: Negative.     All other systems reviewed and are negative.      Past Medical History:   Diagnosis Date    Allergic     Allergic rhinitis     Asthma     Attention deficit disorder     Bipolar 1 disorder     Chlamydia     Depression     Fetal alcohol spectrum disorder     Fetal alcohol syndrome     Gestational diabetes     Herpes     Insomnia     Nocturnal enuresis     Seizure        Allergies   Allergen Reactions    Penicillins Anaphylaxis    Shellfish-Derived Products Anaphylaxis    Peanut-Containing Drug Products Unknown (See Comments)     positive on past allergy testing (paper scanned)    Shellfish-Derived Products Unknown (See Comments)     + on past allergy testing per Foster mother.     Penicillins Unknown (See Comments)     Positive on allergy testing       Past Surgical History:   Procedure Laterality Date     SECTION N/A 7/3/2022    Procedure:  SECTION PRIMARY;  Surgeon: Jolene Bellamy DO;  Location:  [facility];  Service: Obstetrics;  Laterality: N/A;     SECTION N/A 10/14/2024    Procedure:  SECTION REPEAT;  Surgeon: Quirino Padilla DO;  Location:  [facility];  Service: Obstetrics/Gynecology;  Laterality: N/A;     SHUNT INSERTION       SHUNT REMOVAL         Family History   Adopted: Yes   Family history unknown: Yes       Social History     Socioeconomic History    Marital status: Single   Tobacco Use    Smoking status: Every Day     Current packs/day: 0.25     Average packs/day: 0.3 packs/day for 5.0 years (1.3 ttl pk-yrs)     Types: Cigarettes     Passive exposure: Current    Smokeless tobacco: Never   Vaping Use    Vaping status: Some Days    Substances: Nicotine   Substance and Sexual Activity    Alcohol use: No    Drug use: Yes     Frequency: 1.0 times per week     Types: Marijuana     Sexual activity: Yes           Objective   Physical Exam  Vitals and nursing note reviewed.   Constitutional:       General: She is not in acute distress.     Appearance: Normal appearance. She is well-developed. She is not ill-appearing, toxic-appearing or diaphoretic.   HENT:      Head: Normocephalic.      Mouth/Throat:      Mouth: Mucous membranes are moist.      Pharynx: Oropharynx is clear.   Eyes:      General: No scleral icterus.     Conjunctiva/sclera: Conjunctivae normal.      Pupils: Pupils are equal, round, and reactive to light.   Cardiovascular:      Rate and Rhythm: Normal rate and regular rhythm.   Pulmonary:      Effort: Pulmonary effort is normal.      Breath sounds: Normal breath sounds.   Abdominal:      General: There is no distension.      Palpations: Abdomen is soft.      Tenderness: There is abdominal tenderness. There is no right CVA tenderness or left CVA tenderness.      Comments: Incision low transverse across suprapubic abdomen consistent with recent  with clean, dry, intact Steri-Strips.  Just to the right of the midline there is a very slight opening with no drainage, no purulent discharge.  No surrounding or streaking erythema.  Diffuse tenderness along the incision with abdomen otherwise soft, nontender, nondistended.   Genitourinary:     Comments: PATIENT DECLINES  Musculoskeletal:         General: Normal range of motion.      Cervical back: Neck supple.   Skin:     General: Skin is warm and dry.   Neurological:      Mental Status: She is alert and oriented to person, place, and time.      Gait: Gait normal.   Psychiatric:         Mood and Affect: Mood normal.         Behavior: Behavior normal. Behavior is cooperative.         Procedures           ED Course  ED Course as of 10/22/24 1647   e Oct 22, 2024   1639 Notified by Genny EHRBERT at this time that patient left AMA.  Patient did not receive her antibiotics was unwilling to provide a urine sample and did not perform the  self wet prep. [JS]      ED Course User Index  [JS] Abdirashid Choudhury PA-C                                               Medical Decision Making  Amount and/or Complexity of Data Reviewed  External Data Reviewed: labs, radiology and notes.  Labs: ordered. Decision-making details documented in ED Course.  Radiology: ordered. Decision-making details documented in ED Course.  ECG/medicine tests: ordered. Decision-making details documented in ED Course.    Risk  Prescription drug management.      Patient is a 19-year-old female presenting to ED with postoperative bleeding and drainage.  PMH significant for ADD, fetal alcohol spectrum disorder, bipolar, history gestational diabetes, recent  10/14/2024.  Upon initial valuation patient resting comfortably in the bed in no acute distress.  Nontoxic-appearing, non-ill-appearing, nondiaphoretic.  Patient with unremarkable vital signs.  Examination finds incision low transverse across suprapubic abdomen consistent with recent  with clean, dry, intact Steri-Strips.  Just to the right of the midline there is a very slight opening with no drainage, no purulent discharge.  No surrounding or streaking erythema.  Diffuse tenderness along the incision with abdomen otherwise soft, nontender, nondistended.  Examination otherwise unremarkable.  Patient declines pelvic exam and would like to perform wet prep by herself.  Discussed with patient need for labs, urinalysis and ability to perform a CT of the abdomen and pelvis as well as ability to use IV antiemetics which she is amenable with no further questions, concerns, needs at this time.    Differential diagnosis: Postoperative bleeding, postoperative seroma, wound dehiscence, urinary tract infection, bacterial vaginosis, bacterial yeast infection, trichomonas, anemia, systemic infection, other    Lab work revealed normal blood cell 5.6, H&H 11/36.1 which is improved from 9.2/30.17 days ago.  Normal platelets and no  further findings.  CMP with no electro disturbances, normal renal and hepatic function and normal anion gap.  Low concern for pancreatic abnormality such as pancreatitis with normal lipase.  Low concern for systemic bacterial process with normal procalcitonin. CT of the abdomen and pelvis showed: [place] fluid collection with enhancing wall in the subcutaneous soft tissues at the incision anteriorly at the pelvis likely related to abscess, complex fluid likely blood products in the endometrial canal which is distended.    Notified by Genny HERBERT that patient left AMA.  Patient did not receive her antibiotics was unwilling to provide a urine sample and did not perform the self wet prep.    Final diagnoses:   Incisional abscess   S/P  section   Nausea   Left against medical advice       ED Disposition  ED Disposition       ED Disposition   AMA    Condition   --    Comment   --               Provider, No Known  [facility] [address]  [phone]    Schedule an appointment as soon as possible for a visit in 2 days      PATIENT CONNECTION - [facility] [address]  [phone]  Schedule an appointment as soon as possible for a visit in 2 days      [facility] EMERGENCY DEPARTMENT  [address] [facility] [id number] [phone]    As needed    Fab Chavarria MD  [address] [facility] [address]  [phone]    Schedule an appointment as soon as possible for a visit in 2 days           Medication List        New Prescriptions      * clindamycin 300 MG capsule  Commonly known as: CLEOCIN  Take 1 capsule by mouth 4 (Four) Times a Day for 10 days.     * clindamycin 300 MG capsule  Commonly known as: CLEOCIN  Take 1 capsule by mouth 4 (Four) Times a Day for 10 days.     ondansetron ODT 4 MG disintegrating tablet  Commonly known as: ZOFRAN-ODT  Place 1 tablet on the tongue Every 6 (Six) Hours As Needed for Nausea.           * This list has 2 medication(s) that  are the same as other medications prescribed for you. Read the directions carefully, and ask your doctor or other care provider to review them with you.                   Where to Get Your Medications        These medications were sent to [facility] - [place], [place] - [address] - [phone] [facility] [phone]   [address] [address]      Phone: [phone]   clindamycin 300 MG capsule  clindamycin 300 MG capsule  ondansetron ODT 4 MG disintegrating tablet            Abdirashid Chouhdury PA-C  10/22/24 1311

## 2024-10-23 NOTE — ED NOTES
Pt stats that she wants to leave. Pt alert and oriented at this time. Dr. Dumont aware. Dr. Dumont at bedside speaking with pt. Risks reviewed with patient. Pt verbalized understanding.

## 2024-10-23 NOTE — ED PROVIDER NOTES
[facility] EMERGENCY DEPT  eMERGENCY dEPARTMENT eNCOUnter      Pt Name: Shukri Topete  MRN: [id number]  Birthdate 2005  Date of evaluation: 10/22/2024  Provider: Taniya Dumont DO    CHIEF COMPLAINT       Chief Complaint   Patient presents with    Vaginal Bleeding     Pt states she recently had a c section at [facility] r/t complications at 31weeks happened 1 week ago.           HISTORY OF PRESENT ILLNESS   (Location/Symptom, Timing/Onset,Context/Setting, Quality, Duration, Modifying Factors, Severity)  Note limiting factors.   Shukri Topete is a 19 y.o. female who presents to the emergency department     Patient is a 20 yo F who presents to the ED c/o vaginal bleeding. She says she is leaving against medical advice at this time.  She says she is bleeding internally.  She says she recently had a  at [facility] and says she is going to leave this emergency department and go there to be seen by her gynecologist.  She was seen there today but says nobody told her anything and she had not had anything to eat or drink all day and decided to leave. She denies any other complaints/concerns at this time.             NursingNotes were reviewed.    REVIEW OF SYSTEMS    (2-9 systems for level 4, 10 or more for level 5)     As per HPI         PAST MEDICALHISTORY     Past Medical History:   Diagnosis Date    ADHD (attention deficit hyperactivity disorder)          SURGICAL HISTORY     No past surgical history on file.      CURRENT MEDICATIONS     Previous Medications    AMPHETAMINE-DEXTROAMPHETAMINE (ADDERALL XR) 15 MG EXTENDED RELEASE CAPSULE    Take 15 mg by mouth every morning    BUDESONIDE-FORMOTEROL FUMARATE (SYMBICORT IN)    Inhale into the lungs    GUAIFENESIN PO    Take by mouth    LAMOTRIGINE (LAMICTAL PO)    Take by mouth    LISDEXAMFETAMINE DIMESYLATE (VYVANSE PO)    Take by mouth    MELATONIN 3 MG TABS TABLET    Take 3 mg by mouth daily    ONDANSETRON (ZOFRAN-ODT) 4 MG DISINTEGRATING TABLET    Take 1 tablet by

## (undated) DEVICE — SAFEPICO ASPIRATOR ARTERIAL BLOOD SAMPLER. ASPIRATOR ONLY WITH VENTED TIPCAP, 1.5 ML, BOX OF 100 PCS.: Brand: SAFEPICO ASPIRATOR

## (undated) DEVICE — SPNG GZ WOVN 4X4IN 12PLY 10/BX STRL

## (undated) DEVICE — SUT MNCRYL 0/0 CTX 36IN Y398H

## (undated) DEVICE — CVR HNDL LIGHT RIGID

## (undated) DEVICE — 3M™ MEDIPORE™ H SOFT CLOTH SURGICAL TAPE 2868, 8 INCH X 10 YARD (20,3CM X 9,1M), 6 ROLLS/CASE: Brand: 3M™ MEDIPORE™

## (undated) DEVICE — ANTIBACTERIAL UNDYED BRAIDED (POLYGLACTIN 910), SYNTHETIC ABSORBABLE SUTURE: Brand: COATED VICRYL

## (undated) DEVICE — APPL CHLORAPREP HI/LITE 26ML ORNG

## (undated) DEVICE — GLOVE,SURG,SENSICARE SLT,LF,PF,7.5: Brand: MEDLINE

## (undated) DEVICE — PATIENT RETURN ELECTRODE, SINGLE-USE, CONTACT QUALITY MONITORING, ADULT, WITH 15 FT (4.5 M) CORD. FOR PATIENTS WEIGHING OVER 33LBS. (15KG): Brand: MEGADYNE

## (undated) DEVICE — Device

## (undated) DEVICE — GOWN,PREVENTION PLUS,XXLARGE,STERILE: Brand: MEDLINE

## (undated) DEVICE — PENCL SMOKE/EVAC MEGADYNE TELESCP 10FT

## (undated) DEVICE — 3M™ STERI-STRIP™ BLEND TONE SKIN CLOSURES, B1557, TAN, 1/2 IN X 4 IN (12MM X 100MM), 6 STRIPS/ENVELOPE: Brand: 3M™ STERI-STRIP™

## (undated) DEVICE — KENDALL SCD EXPRESS SLEEVES, KNEE LENGTH, LARGE: Brand: KENDALL SCD

## (undated) DEVICE — PAD C-SECTION: Brand: MEDLINE INDUSTRIES, INC.

## (undated) DEVICE — GLOVE,SURG,SENSICARE SLT,LF,PF,7: Brand: MEDLINE

## (undated) DEVICE — SUT GUT PLAIN TPR PT 2/0 27IN 53T

## (undated) DEVICE — 3M™ STERI-STRIP™ REINFORCED ADHESIVE SKIN CLOSURES, R1547, 1/2 IN X 4 IN (12 MM X 100 MM), 6 STRIPS/ENVELOPE: Brand: 3M™ STERI-STRIP™

## (undated) DEVICE — GLV SURG SENSICARE SLT PF LF 6 STRL

## (undated) DEVICE — ADHS LIQ MASTISOL 2/3ML

## (undated) DEVICE — LARGE, DISPOSABLE C-SECTION RETRACTOR: Brand: ALEXIS ® O C-SECTION PROTECTOR/RETRACTOR

## (undated) DEVICE — SUT VIC 0 CT1 36IN J946H

## (undated) DEVICE — ANTIBACTERIAL VIOLET BRAIDED (POLYGLACTIN 910), SYNTHETIC ABSORBABLE SUTURE: Brand: COATED VICRYL

## (undated) DEVICE — SUT MNCRYL 4/0 PS2 27IN UD MCP426H